# Patient Record
Sex: MALE | Race: WHITE | NOT HISPANIC OR LATINO | URBAN - METROPOLITAN AREA
[De-identification: names, ages, dates, MRNs, and addresses within clinical notes are randomized per-mention and may not be internally consistent; named-entity substitution may affect disease eponyms.]

---
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
## 2024-01-08 ENCOUNTER — APPOINTMENT (OUTPATIENT)
Dept: GENERAL RADIOLOGY | Facility: HOSPITAL | Age: 24
End: 2024-01-08
Payer: OTHER MISCELLANEOUS

## 2024-01-08 ENCOUNTER — HOSPITAL ENCOUNTER (EMERGENCY)
Facility: HOSPITAL | Age: 24
Discharge: HOME | End: 2024-01-08
Attending: EMERGENCY MEDICINE | Admitting: EMERGENCY MEDICINE
Payer: OTHER MISCELLANEOUS

## 2024-01-08 VITALS
DIASTOLIC BLOOD PRESSURE: 65 MMHG | HEIGHT: 72 IN | HEART RATE: 96 BPM | WEIGHT: 184.75 LBS | OXYGEN SATURATION: 98 % | RESPIRATION RATE: 18 BRPM | BODY MASS INDEX: 25.02 KG/M2 | SYSTOLIC BLOOD PRESSURE: 138 MMHG | TEMPERATURE: 98.8 F

## 2024-01-08 DIAGNOSIS — S82.892A CLOSED FRACTURE OF LEFT ANKLE, INITIAL ENCOUNTER: Primary | ICD-10-CM

## 2024-01-08 PROCEDURE — 99283 EMERGENCY DEPT VISIT LOW MDM: CPT

## 2024-01-08 PROCEDURE — 73610 X-RAY EXAM OF ANKLE: CPT

## 2024-01-08 PROCEDURE — 25010000002 MORPHINE PER 10 MG: Performed by: EMERGENCY MEDICINE

## 2024-01-08 PROCEDURE — 96374 THER/PROPH/DIAG INJ IV PUSH: CPT

## 2024-01-08 RX ORDER — OXYCODONE HYDROCHLORIDE AND ACETAMINOPHEN 5; 325 MG/1; MG/1
1 TABLET ORAL EVERY 6 HOURS PRN
Qty: 12 TABLET | Refills: 0 | Status: SHIPPED | OUTPATIENT
Start: 2024-01-08

## 2024-01-08 RX ADMIN — MORPHINE SULFATE 4 MG: 4 INJECTION, SOLUTION INTRAMUSCULAR; INTRAVENOUS at 13:47

## 2024-01-08 NOTE — ED PROVIDER NOTES
Time: 2:27 PM EST  Date of encounter:  1/8/2024  Independent Historian/Clinical History and Information was obtained by:   Patient and Family    History is limited by: N/A    Chief Complaint: Fall      History of Present Illness:  Patient is a 23 y.o. year old male who presents to the emergency department for evaluation of injury status post fall.  Patient was on a ladder at work and fell unsure exactly how far.  Patient reports when he fell he came down on his left leg does not not report hitting his head or loss of consciousness.  He reports he was unable to bear weight afterwards.    HPI    Patient Care Team  Primary Care Provider: Vinod Rosario MD    Past Medical History:     No Known Allergies  History reviewed. No pertinent past medical history.  History reviewed. No pertinent surgical history.  History reviewed. No pertinent family history.    Home Medications:  Prior to Admission medications    Medication Sig Start Date End Date Taking? Authorizing Provider   oxyCODONE-acetaminophen (PERCOCET) 5-325 MG per tablet Take 1 tablet by mouth Every 6 (Six) Hours As Needed for Severe Pain. 1/8/24   Robson Mandujano MD        Social History:   Social History     Tobacco Use    Smoking status: Never    Smokeless tobacco: Current     Types: Snuff   Vaping Use    Vaping Use: Never used   Substance Use Topics    Alcohol use: Not Currently    Drug use: Never         Review of Systems:  Review of Systems   Constitutional:  Negative for chills and fever.   HENT:  Negative for congestion, rhinorrhea and sore throat.    Eyes:  Negative for pain and visual disturbance.   Respiratory:  Negative for apnea, cough, chest tightness and shortness of breath.    Cardiovascular:  Negative for chest pain and palpitations.   Gastrointestinal:  Negative for abdominal pain, diarrhea, nausea and vomiting.   Genitourinary:  Negative for difficulty urinating and dysuria.   Musculoskeletal:  Negative for joint swelling and myalgias.   Skin:  " Negative for color change.   Neurological:  Negative for seizures and headaches.   Psychiatric/Behavioral: Negative.     All other systems reviewed and are negative.       Physical Exam:  /59 (BP Location: Right arm, Patient Position: Lying)   Pulse 77   Temp 98.8 °F (37.1 °C) (Oral)   Resp 18   Ht 182.9 cm (72\")   Wt 83.8 kg (184 lb 11.9 oz)   SpO2 99%   BMI 25.06 kg/m²     Physical Exam  Vitals and nursing note reviewed.   Constitutional:       General: He is not in acute distress.     Appearance: Normal appearance. He is not toxic-appearing.   HENT:      Head: Normocephalic and atraumatic.      Jaw: There is normal jaw occlusion.   Eyes:      General: Lids are normal.      Extraocular Movements: Extraocular movements intact.      Conjunctiva/sclera: Conjunctivae normal.      Pupils: Pupils are equal, round, and reactive to light.   Cardiovascular:      Rate and Rhythm: Normal rate and regular rhythm.      Pulses: Normal pulses.      Heart sounds: Normal heart sounds.   Pulmonary:      Effort: Pulmonary effort is normal. No respiratory distress.      Breath sounds: Normal breath sounds. No wheezing or rhonchi.   Abdominal:      General: Abdomen is flat.      Palpations: Abdomen is soft.      Tenderness: There is no abdominal tenderness. There is no guarding or rebound.   Musculoskeletal:      Cervical back: Normal range of motion and neck supple.      Right lower leg: No edema.      Left lower leg: No edema.      Comments: Left lower extremity: Swelling with tenderness to palpation of the ankle joint.  Neurovascular intact distally   Skin:     General: Skin is warm and dry.   Neurological:      Mental Status: He is alert and oriented to person, place, and time. Mental status is at baseline.   Psychiatric:         Mood and Affect: Mood normal.                  Procedures:  Procedures      Medical Decision Making:      Comorbidities that affect care:    None    External Notes reviewed:    None      The " following orders were placed and all results were independently analyzed by me:  Orders Placed This Encounter   Procedures    Splint Application    [facility] 11.  Crutches; Prevents Completion of MRADLs Within Reasonable Time Frame; Able to Safely Use Equipment; Mobility Deficit Can Be Sufficiently Resolved By Use of Equipment    XR Ankle 3+ View Left       Medications Given in the Emergency Department:  Medications   morphine injection 4 mg (4 mg Intravenous Given 1/8/24 1347)        ED Course:         Labs:    Lab Results (last 24 hours)       ** No results found for the last 24 hours. **             Imaging:    XR Ankle 3+ View Left    Result Date: 1/8/2024  PROCEDURE: XR ANKLE 3+ VW LEFT  COMPARISON: None  INDICATIONS: LEFT ANKLE PAIN AND SWELLING; FALL OFF OF LADDER TODAY AT WORK  FINDINGS:  There is a transverse fracture of the medial malleolus at the level of the ankle mortise.  There is also a transverse fracture of the distal fibula approximately 6 cm superior to the ankle mortise.  There appears to be disruption and widening of the syndesmosis inferior to the fibular fracture, and avulsion fractures at the lateral distal tibia.  There is mild lateral displacement of the medial malleolus and distal fibula with mild lateral talar shift.  There is surrounding soft tissue edema.        1. Transverse fractures of the medial malleolus and distal fibula with mild lateral displacement and lateral talar shift. 2. Disruption and widening of the distal syndesmosis and avulsion fractures at the lateral distal tibia.      POLO ENGEL MD       Electronically Signed and Approved By: POLO ENGEL MD on 1/08/2024 at 13:47                Differential Diagnosis and Discussion:    Extremity Pain: Differential diagnosis includes but is not limited to soft tissue sprain, tendonitis, tendon injury, dislocation, fracture, deep vein thrombosis, arterial insufficiency, osteoarthritis, bursitis, and ligamentous  damage.    All X-rays impressions were independently interpreted by me.    MDM  Number of Diagnoses or Management Options  Closed fracture of left ankle, initial encounter  Diagnosis management comments: In summary this is a 23-year-old male patient presents to the emergency department for evaluation of left leg injury status post fall from ladder.  X-ray confirms medial malleolus and distal fibula fracture.  Patient been placed in splint and given crutches.  Prescription pain control given.  Orthopedic surgery requested follow-up next Wednesday.  Very strict return to ER and follow-up instructions have been provided to the patient.                   Patient Care Considerations:    CT EXTREMITY: I considered ordering an extremity CT, however no signs of vascular compromise      Consultants/Shared Management Plan:    Consultant: I have discussed the case with Dr. Kirkland who states splint and follow-up next Wednesday in office    Social Determinants of Health:    Patient is independent, reliable, and has access to care.       Disposition and Care Coordination:    Discharged: The patient is suitable and stable for discharge with no need for consideration of observation or admission.    I have explained the patient´s condition, diagnoses and treatment plan based on the information available to me at this time. I have answered questions and addressed any concerns. The patient has a good  understanding of the patient´s diagnosis, condition, and treatment plan as can be expected at this point. The vital signs have been stable. The patient´s condition is stable and appropriate for discharge from the emergency department.      The patient will pursue further outpatient evaluation with the primary care physician or other designated or consulting physician as outlined in the discharge instructions. They are agreeable to this plan of care and follow-up instructions have been explained in detail. The patient has received these  instructions in written format and have expressed an understanding of the discharge instructions. The patient is aware that any significant change in condition or worsening of symptoms should prompt an immediate return to this or the closest emergency department or call to 1.  I have explained discharge medications and the need for follow up with the patient/caretakers. This was also printed in the discharge instructions. Patient was discharged with the following medications and follow up:      Medication List        New Prescriptions      oxyCODONE-acetaminophen 5-325 MG per tablet  Commonly known as: PERCOCET  Take 1 tablet by mouth Every 6 (Six) Hours As Needed for Severe Pain.               Where to Get Your Medications        These medications were sent to [place], [place] - [address] [place] [address] [phone]  - [phone] [address] [address]      Phone: [phone]   oxyCODONE-acetaminophen 5-325 MG per tablet      Been, Corby DOZIER MD  [address]   [place] [address] [phone]    On 1/17/2024  call       Final diagnoses:   Closed fracture of left ankle, initial encounter        ED Disposition       ED Disposition   Discharge    Condition   Stable    Comment   --               This medical record created using voice recognition software.             Robson Mandujano MD  01/08/24 [id number]

## 2024-01-11 ENCOUNTER — TELEPHONE (OUTPATIENT)
Dept: ORTHOPEDIC SURGERY | Facility: CLINIC | Age: 24
End: 2024-01-11
Payer: OTHER MISCELLANEOUS

## 2024-01-11 NOTE — TELEPHONE ENCOUNTER
1.Transverse fractures of the medial malleolus and distal fibula with mild lateral displacement   and lateral talar shift.  2. Disruption and widening of the distal syndesmosis and avulsion fractures at the lateral distal tibia.   XRAYS 1/8

## 2024-01-12 ENCOUNTER — OFFICE VISIT (OUTPATIENT)
Dept: ORTHOPEDIC SURGERY | Facility: CLINIC | Age: 24
End: 2024-01-12
Payer: OTHER MISCELLANEOUS

## 2024-01-12 ENCOUNTER — PREP FOR SURGERY (OUTPATIENT)
Dept: OTHER | Facility: HOSPITAL | Age: 24
End: 2024-01-12
Payer: OTHER MISCELLANEOUS

## 2024-01-12 VITALS
OXYGEN SATURATION: 95 % | BODY MASS INDEX: 23.7 KG/M2 | WEIGHT: 175 LBS | SYSTOLIC BLOOD PRESSURE: 145 MMHG | HEIGHT: 72 IN | HEART RATE: 86 BPM | DIASTOLIC BLOOD PRESSURE: 81 MMHG

## 2024-01-12 DIAGNOSIS — S82.52XA CLOSED DISPLACED FRACTURE OF MEDIAL MALLEOLUS OF LEFT TIBIA, INITIAL ENCOUNTER: Primary | ICD-10-CM

## 2024-01-12 RX ORDER — CEFAZOLIN SODIUM IN 0.9 % NACL 3 G/100 ML
3 INTRAVENOUS SOLUTION, PIGGYBACK (ML) INTRAVENOUS ONCE
Status: CANCELLED | OUTPATIENT
Start: 2024-01-12 | End: 2024-01-12

## 2024-01-12 RX ORDER — CEFAZOLIN SODIUM 2 G/100ML
2 INJECTION, SOLUTION INTRAVENOUS ONCE
Status: CANCELLED | OUTPATIENT
Start: 2024-01-12 | End: 2024-01-12

## 2024-01-12 NOTE — PROGRESS NOTES
"Chief Complaint  Initial Evaluation of the Left Ankle     Subjective      Eugenio Echols presents to [facility] for an evaluation of his left ankle. Patient states he was at work when he fell off a ladder and landed on his ankle. He was seen in the Emergency room on 01/08/24 where he had x-rays done. He is ambulating today with crutches and in a short leg splint. This is a work comp claim.      Allergies   Allergen Reactions    Doxycycline Shortness Of Breath        Social History     Socioeconomic History    Marital status: Single   Tobacco Use    Smoking status: Never    Smokeless tobacco: Current     Types: Snuff   Vaping Use    Vaping Use: Never used   Substance and Sexual Activity    Alcohol use: Not Currently    Drug use: Never    Sexual activity: Defer        I reviewed the patient's chief complaint, history of present illness, review of systems, past medical history, surgical history, family history, social history, medications, and allergy list.     Review of Systems     Constitutional: Denies fevers, chills, weight loss  Cardiovascular: Denies chest pain, shortness of breath  Skin: Denies rashes, acute skin changes  Neurologic: Denies headache, loss of consciousness  MSK: Left ankle pain       Vital Signs:   /81   Pulse 86   Ht 182.9 cm (72\")   Wt 79.4 kg (175 lb)   SpO2 95%   BMI 23.73 kg/m²          Physical Exam  General: Alert. No acute distress    Ortho Exam      Left lower extremity: short leg splint in place, splint is clean, dry and intact, limited range of motion due to pain, moderate swelling, able to move toes, sensation intact, neurovascularly intact      Procedures        Imaging Results (Most Recent)       None             Result Review :       XR Ankle 3+ View Left    Result Date: 1/8/2024  Narrative: PROCEDURE: XR ANKLE 3+ VW LEFT  COMPARISON: None  INDICATIONS: LEFT ANKLE PAIN AND SWELLING; FALL OFF OF LADDER TODAY AT WORK  FINDINGS:  There is a " transverse fracture of the medial malleolus at the level of the ankle mortise.  There is also a transverse fracture of the distal fibula approximately 6 cm superior to the ankle mortise.  There appears to be disruption and widening of the syndesmosis inferior to the fibular fracture, and avulsion fractures at the lateral distal tibia.  There is mild lateral displacement of the medial malleolus and distal fibula with mild lateral talar shift.  There is surrounding soft tissue edema.      Impression:   1. Transverse fractures of the medial malleolus and distal fibula with mild lateral displacement and lateral talar shift. 2. Disruption and widening of the distal syndesmosis and avulsion fractures at the lateral distal tibia.      POLO ENGEL MD       Electronically Signed and Approved By: POLO ENGEL MD on 1/08/2024 at 13:47                     Assessment and Plan     Diagnoses and all orders for this visit:    1. Closed displaced fracture of medial malleolus of left tibia, initial encounter (Primary)        The patient presents here today for an evaluation of his left ankle. X-rays were reviewed with the patient that were taken in the ER.     Discussed the risks and benefits of operative treatment plan with the patient. Patient expressed understanding and wishes to proceed with left ankle ORIF.    Will obtain X-Rays of left ankle at next visit.    Order for a knee scooter given today.     Discussed surgery., Risks/benefits discussed with patient including, but not limited to: infection, bleeding, neurovascular damage, malunion, nonunion, aesthetic deformity, need for further surgery, and death., Surgery pamphlet given., and Call or return if worsening symptoms.    Follow Up     2 weeks postop       Patient was given instructions and counseling regarding his condition or for health maintenance advice. Please see specific information pulled into the AVS if appropriate.     Scribed for Corby Kirkland MD by Izabella  Puja.  01/12/24   12:28 EST    I have personally performed the services described in this document as scribed by the above individual and it is both accurate and complete. Corby Kirkland MD 01/12/24

## 2024-01-15 ENCOUNTER — TELEPHONE (OUTPATIENT)
Dept: ORTHOPEDIC SURGERY | Facility: CLINIC | Age: 24
End: 2024-01-15
Payer: OTHER MISCELLANEOUS

## 2024-01-15 ENCOUNTER — ANESTHESIA (OUTPATIENT)
Dept: PERIOP | Facility: HOSPITAL | Age: 24
End: 2024-01-15
Payer: OTHER MISCELLANEOUS

## 2024-01-15 ENCOUNTER — HOSPITAL ENCOUNTER (OUTPATIENT)
Facility: HOSPITAL | Age: 24
Discharge: HOME | End: 2024-01-15
Attending: ORTHOPAEDIC SURGERY | Admitting: ORTHOPAEDIC SURGERY
Payer: OTHER MISCELLANEOUS

## 2024-01-15 ENCOUNTER — TELEPHONE (OUTPATIENT)
Dept: ORTHOPEDIC SURGERY | Facility: CLINIC | Age: 24
End: 2024-01-15

## 2024-01-15 ENCOUNTER — ANESTHESIA EVENT CONVERTED (OUTPATIENT)
Dept: ANESTHESIOLOGY | Facility: HOSPITAL | Age: 24
End: 2024-01-15
Payer: OTHER MISCELLANEOUS

## 2024-01-15 ENCOUNTER — APPOINTMENT (OUTPATIENT)
Dept: GENERAL RADIOLOGY | Facility: HOSPITAL | Age: 24
End: 2024-01-15
Payer: OTHER MISCELLANEOUS

## 2024-01-15 ENCOUNTER — ANESTHESIA EVENT (OUTPATIENT)
Dept: PERIOP | Facility: HOSPITAL | Age: 24
End: 2024-01-15
Payer: OTHER MISCELLANEOUS

## 2024-01-15 VITALS
RESPIRATION RATE: 16 BRPM | WEIGHT: 180 LBS | DIASTOLIC BLOOD PRESSURE: 77 MMHG | HEART RATE: 80 BPM | OXYGEN SATURATION: 100 % | TEMPERATURE: 97.2 F | SYSTOLIC BLOOD PRESSURE: 121 MMHG | HEIGHT: 72 IN | BODY MASS INDEX: 24.38 KG/M2

## 2024-01-15 DIAGNOSIS — S82.52XA CLOSED DISPLACED FRACTURE OF MEDIAL MALLEOLUS OF LEFT TIBIA, INITIAL ENCOUNTER: ICD-10-CM

## 2024-01-15 DIAGNOSIS — S82.842A ANKLE FRACTURE, BIMALLEOLAR, CLOSED, LEFT, INITIAL ENCOUNTER: Primary | ICD-10-CM

## 2024-01-15 PROCEDURE — C1713 ANCHOR/SCREW BN/BN,TIS/BN: HCPCS | Performed by: ORTHOPAEDIC SURGERY

## 2024-01-15 PROCEDURE — 25010000002 CEFAZOLIN IN DEXTROSE 2000 MG/ 100 ML SOLUTION: Performed by: ORTHOPAEDIC SURGERY

## 2024-01-15 PROCEDURE — C1769 GUIDE WIRE: HCPCS | Performed by: ORTHOPAEDIC SURGERY

## 2024-01-15 PROCEDURE — 25010000002 FENTANYL CITRATE (PF) 50 MCG/ML SOLUTION: Performed by: NURSE ANESTHETIST, CERTIFIED REGISTERED

## 2024-01-15 PROCEDURE — 25810000003 LACTATED RINGERS PER 1000 ML: Performed by: ANESTHESIOLOGY

## 2024-01-15 PROCEDURE — 25010000002 ROPIVACAINE PER 1 MG: Performed by: ANESTHESIOLOGY

## 2024-01-15 PROCEDURE — 25010000002 PROPOFOL 10 MG/ML EMULSION: Performed by: NURSE ANESTHETIST, CERTIFIED REGISTERED

## 2024-01-15 PROCEDURE — 25010000002 ONDANSETRON PER 1 MG: Performed by: NURSE ANESTHETIST, CERTIFIED REGISTERED

## 2024-01-15 PROCEDURE — 25010000002 MIDAZOLAM PER 1MG: Performed by: ANESTHESIOLOGY

## 2024-01-15 PROCEDURE — 25010000002 SUGAMMADEX 200 MG/2ML SOLUTION: Performed by: NURSE ANESTHETIST, CERTIFIED REGISTERED

## 2024-01-15 PROCEDURE — 25010000002 DEXAMETHASONE PER 1 MG: Performed by: NURSE ANESTHETIST, CERTIFIED REGISTERED

## 2024-01-15 PROCEDURE — 76000 FLUOROSCOPY <1 HR PHYS/QHP: CPT

## 2024-01-15 PROCEDURE — 27814 TREATMENT OF ANKLE FRACTURE: CPT | Performed by: ORTHOPAEDIC SURGERY

## 2024-01-15 PROCEDURE — 25010000002 FENTANYL CITRATE (PF) 50 MCG/ML SOLUTION: Performed by: ANESTHESIOLOGY

## 2024-01-15 PROCEDURE — S0260 H&P FOR SURGERY: HCPCS | Performed by: ORTHOPAEDIC SURGERY

## 2024-01-15 DEVICE — LOCKING SCREW
Type: IMPLANTABLE DEVICE | Site: ANKLE | Status: FUNCTIONAL
Brand: VARIAX

## 2024-01-15 DEVICE — BONE SCREW
Type: IMPLANTABLE DEVICE | Site: ANKLE | Status: FUNCTIONAL
Brand: VARIAX

## 2024-01-15 DEVICE — CANNULATED SCREW
Type: IMPLANTABLE DEVICE | Site: ANKLE | Status: FUNCTIONAL
Brand: ASNIS

## 2024-01-15 DEVICE — ONE-THIRD TUBULAR PLATE: Type: IMPLANTABLE DEVICE | Site: ANKLE | Status: FUNCTIONAL

## 2024-01-15 RX ORDER — DEXAMETHASONE SODIUM PHOSPHATE 4 MG/ML
INJECTION, SOLUTION INTRA-ARTICULAR; INTRALESIONAL; INTRAMUSCULAR; INTRAVENOUS; SOFT TISSUE AS NEEDED
Status: DISCONTINUED | OUTPATIENT
Start: 2024-01-15 | End: 2024-01-15 | Stop reason: SURG

## 2024-01-15 RX ORDER — ROCURONIUM BROMIDE 10 MG/ML
INJECTION, SOLUTION INTRAVENOUS AS NEEDED
Status: DISCONTINUED | OUTPATIENT
Start: 2024-01-15 | End: 2024-01-15 | Stop reason: SURG

## 2024-01-15 RX ORDER — ROPIVACAINE HYDROCHLORIDE 5 MG/ML
INJECTION, SOLUTION EPIDURAL; INFILTRATION; PERINEURAL
Status: COMPLETED | OUTPATIENT
Start: 2024-01-15 | End: 2024-01-15

## 2024-01-15 RX ORDER — MEPERIDINE HYDROCHLORIDE 25 MG/ML
12.5 INJECTION INTRAMUSCULAR; INTRAVENOUS; SUBCUTANEOUS
Status: DISCONTINUED | OUTPATIENT
Start: 2024-01-15 | End: 2024-01-15 | Stop reason: HOSPADM

## 2024-01-15 RX ORDER — FENTANYL CITRATE 50 UG/ML
100 INJECTION, SOLUTION INTRAMUSCULAR; INTRAVENOUS ONCE
Status: COMPLETED | OUTPATIENT
Start: 2024-01-15 | End: 2024-01-15

## 2024-01-15 RX ORDER — CEFAZOLIN SODIUM IN 0.9 % NACL 3 G/100 ML
3 INTRAVENOUS SOLUTION, PIGGYBACK (ML) INTRAVENOUS ONCE
Status: DISCONTINUED | OUTPATIENT
Start: 2024-01-15 | End: 2024-01-15

## 2024-01-15 RX ORDER — ONDANSETRON 2 MG/ML
INJECTION INTRAMUSCULAR; INTRAVENOUS AS NEEDED
Status: DISCONTINUED | OUTPATIENT
Start: 2024-01-15 | End: 2024-01-15 | Stop reason: SURG

## 2024-01-15 RX ORDER — PROMETHAZINE HYDROCHLORIDE 25 MG/1
25 SUPPOSITORY RECTAL ONCE AS NEEDED
Status: DISCONTINUED | OUTPATIENT
Start: 2024-01-15 | End: 2024-01-15 | Stop reason: HOSPADM

## 2024-01-15 RX ORDER — LIDOCAINE HYDROCHLORIDE 20 MG/ML
INJECTION, SOLUTION EPIDURAL; INFILTRATION; INTRACAUDAL; PERINEURAL AS NEEDED
Status: DISCONTINUED | OUTPATIENT
Start: 2024-01-15 | End: 2024-01-15 | Stop reason: SURG

## 2024-01-15 RX ORDER — DEXMEDETOMIDINE HYDROCHLORIDE 100 UG/ML
INJECTION, SOLUTION INTRAVENOUS AS NEEDED
Status: DISCONTINUED | OUTPATIENT
Start: 2024-01-15 | End: 2024-01-15 | Stop reason: SURG

## 2024-01-15 RX ORDER — HYDROCODONE BITARTRATE AND ACETAMINOPHEN 7.5; 325 MG/1; MG/1
1 TABLET ORAL EVERY 4 HOURS PRN
Qty: 30 TABLET | Refills: 0 | Status: SHIPPED | OUTPATIENT
Start: 2024-01-15

## 2024-01-15 RX ORDER — MIDAZOLAM HYDROCHLORIDE 2 MG/2ML
2 INJECTION, SOLUTION INTRAMUSCULAR; INTRAVENOUS ONCE
Status: COMPLETED | OUTPATIENT
Start: 2024-01-15 | End: 2024-01-15

## 2024-01-15 RX ORDER — SODIUM CHLORIDE, SODIUM LACTATE, POTASSIUM CHLORIDE, CALCIUM CHLORIDE 600; 310; 30; 20 MG/100ML; MG/100ML; MG/100ML; MG/100ML
9 INJECTION, SOLUTION INTRAVENOUS CONTINUOUS PRN
Status: DISCONTINUED | OUTPATIENT
Start: 2024-01-15 | End: 2024-01-15 | Stop reason: HOSPADM

## 2024-01-15 RX ORDER — PROPOFOL 10 MG/ML
VIAL (ML) INTRAVENOUS AS NEEDED
Status: DISCONTINUED | OUTPATIENT
Start: 2024-01-15 | End: 2024-01-15 | Stop reason: SURG

## 2024-01-15 RX ORDER — OXYCODONE HYDROCHLORIDE 5 MG/1
5 TABLET ORAL
Status: DISCONTINUED | OUTPATIENT
Start: 2024-01-15 | End: 2024-01-15 | Stop reason: HOSPADM

## 2024-01-15 RX ORDER — PROMETHAZINE HYDROCHLORIDE 12.5 MG/1
25 TABLET ORAL ONCE AS NEEDED
Status: DISCONTINUED | OUTPATIENT
Start: 2024-01-15 | End: 2024-01-15 | Stop reason: HOSPADM

## 2024-01-15 RX ORDER — ACETAMINOPHEN 500 MG
1000 TABLET ORAL ONCE
Status: COMPLETED | OUTPATIENT
Start: 2024-01-15 | End: 2024-01-15

## 2024-01-15 RX ORDER — FENTANYL CITRATE 50 UG/ML
INJECTION, SOLUTION INTRAMUSCULAR; INTRAVENOUS AS NEEDED
Status: DISCONTINUED | OUTPATIENT
Start: 2024-01-15 | End: 2024-01-15 | Stop reason: SURG

## 2024-01-15 RX ORDER — ONDANSETRON 2 MG/ML
4 INJECTION INTRAMUSCULAR; INTRAVENOUS ONCE AS NEEDED
Status: DISCONTINUED | OUTPATIENT
Start: 2024-01-15 | End: 2024-01-15 | Stop reason: HOSPADM

## 2024-01-15 RX ORDER — CEFAZOLIN SODIUM 2 G/100ML
2 INJECTION, SOLUTION INTRAVENOUS ONCE
Status: COMPLETED | OUTPATIENT
Start: 2024-01-15 | End: 2024-01-15

## 2024-01-15 RX ADMIN — DEXAMETHASONE SODIUM PHOSPHATE 4 MG: 4 INJECTION, SOLUTION INTRAMUSCULAR; INTRAVENOUS at 12:12

## 2024-01-15 RX ADMIN — ROCURONIUM BROMIDE 50 MG: 10 INJECTION, SOLUTION INTRAVENOUS at 11:57

## 2024-01-15 RX ADMIN — PROPOFOL 200 MG: 10 INJECTION, EMULSION INTRAVENOUS at 11:57

## 2024-01-15 RX ADMIN — FENTANYL CITRATE 50 MCG: 50 INJECTION, SOLUTION INTRAMUSCULAR; INTRAVENOUS at 11:57

## 2024-01-15 RX ADMIN — LIDOCAINE HYDROCHLORIDE 50 MG: 20 INJECTION, SOLUTION EPIDURAL; INFILTRATION; INTRACAUDAL; PERINEURAL at 11:57

## 2024-01-15 RX ADMIN — DEXMEDETOMIDINE HYDROCHLORIDE 20 MCG: 100 INJECTION, SOLUTION, CONCENTRATE INTRAVENOUS at 11:56

## 2024-01-15 RX ADMIN — ROPIVACAINE HYDROCHLORIDE 25 ML: 5 INJECTION, SOLUTION EPIDURAL; INFILTRATION; PERINEURAL at 11:10

## 2024-01-15 RX ADMIN — FENTANYL CITRATE 100 MCG: 50 INJECTION, SOLUTION INTRAMUSCULAR; INTRAVENOUS at 11:03

## 2024-01-15 RX ADMIN — DEXMEDETOMIDINE HYDROCHLORIDE 10 MCG: 100 INJECTION, SOLUTION, CONCENTRATE INTRAVENOUS at 12:14

## 2024-01-15 RX ADMIN — FENTANYL CITRATE 50 MCG: 50 INJECTION, SOLUTION INTRAMUSCULAR; INTRAVENOUS at 12:09

## 2024-01-15 RX ADMIN — CEFAZOLIN SODIUM 2 G: 2 INJECTION, SOLUTION INTRAVENOUS at 11:57

## 2024-01-15 RX ADMIN — DEXMEDETOMIDINE HYDROCHLORIDE 10 MCG: 100 INJECTION, SOLUTION, CONCENTRATE INTRAVENOUS at 12:07

## 2024-01-15 RX ADMIN — SUGAMMADEX 200 MG: 100 INJECTION, SOLUTION INTRAVENOUS at 12:44

## 2024-01-15 RX ADMIN — MIDAZOLAM HYDROCHLORIDE 2 MG: 1 INJECTION, SOLUTION INTRAMUSCULAR; INTRAVENOUS at 11:03

## 2024-01-15 RX ADMIN — ACETAMINOPHEN 1000 MG: 500 TABLET ORAL at 10:55

## 2024-01-15 RX ADMIN — ONDANSETRON 4 MG: 2 INJECTION INTRAMUSCULAR; INTRAVENOUS at 12:12

## 2024-01-15 RX ADMIN — SODIUM CHLORIDE, POTASSIUM CHLORIDE, SODIUM LACTATE AND CALCIUM CHLORIDE 9 ML/HR: 600; 310; 30; 20 INJECTION, SOLUTION INTRAVENOUS at 10:54

## 2024-01-15 RX ADMIN — ROPIVACAINE HYDROCHLORIDE 30 ML: 5 INJECTION, SOLUTION EPIDURAL; INFILTRATION; PERINEURAL at 11:18

## 2024-01-15 NOTE — ANESTHESIA PROCEDURE NOTES
Peripheral Block    Pre-sedation assessment completed: 1/15/2024 11:13 AM    Patient reassessed immediately prior to procedure    Patient location during procedure: pre-op  Start time: 1/15/2024 11:12 AM  Stop time: 1/15/2024 11:18 AM  Reason for block: at surgeon's request and post-op pain management  Performed by  Anesthesiologist: Pan Castro MD  Preanesthetic Checklist  Completed: patient identified, IV checked, site marked, risks and benefits discussed, surgical consent, monitors and equipment checked, pre-op evaluation and timeout performed  Prep:  Pt Position: supine  Sterile barriers:alcohol skin prep, partial drape, cap, washed/disinfected hands, mask and gloves  Prep: ChloraPrep  Patient monitoring: blood pressure monitoring, continuous pulse oximetry and EKG  Procedure    Sedation: yes  Performed under: local infiltration  Guidance:ultrasound guided and nerve stimulator    ULTRASOUND INTERPRETATION.  Using ultrasound guidance a 20 G gauge needle was placed in close proximity to the nerve, at which point, under ultrasound guidance anesthetic was injected in the area of the nerve and spread of the anesthesia was seen on ultrasound in close proximity thereto.  There were no abnormalities seen on ultrasound; a digital image was taken; and the patient tolerated the procedure with no complications. Images:still images obtained, printed/placed on chart    Laterality:left  Block Type:adductor canal block  Injection Technique:single-shot  Needle Type:echogenic  Needle Gauge:20 G (4in)  Resistance on Injection: none    Medications Used: ropivacaine (NAROPIN) 0.5 % injection - Injection   30 mL - 1/15/2024 11:18:00 AM      Post Assessment  Injection Assessment: negative aspiration for heme, no paresthesia on injection and incremental injection  Patient Tolerance:comfortable throughout block  Complications:no  Additional Notes  The block or continuous infusion is requested by the referring physician for  management of postoperative pain, or pain related to a procedure. Ultrasound guidance (deemed medically necessary). Painless injection, pt was awake and conversant during the procedure without complications. Needle and surrounding structures visualized throughout procedure. No adverse reactions or complications seen during this period. Post-procedure image showed no signs of complication, and anatomy was consistent with an uncomplicated nerve blockade.

## 2024-01-15 NOTE — OP NOTE
ANKLE OPEN REDUCTION INTERNAL FIXATION  Procedure Report    Patient Name:  Eugenio Echols  YOB: 2000    Date of Surgery:  1/15/2024     Indications: See H&P    Pre-op Diagnosis:   Closed displaced fracture of medial malleolus of left tibia, initial encounter [S82.52XA]     Left distal fibula fracture and left malleolus fracture   Post-Op Diagnosis Codes:     * Closed displaced fracture of medial malleolus of left tibia, initial encounter [S82.52XA]    Procedure/CPT® Codes:      Procedure(s):  Open reduction internal fixation of left distal fibula fracture open reduction traumatization of the left medial malleolus fracture      Staff:  Surgeon(s):  Corby Kirkland MD    Assistant: Janell Iverson    Anesthesia: Choice    Estimated Blood Loss: 25 mL    Implants:    Implant Name Type Inv. Item Serial No.  Lot No. LRB No. Used Action   PLT TBG 1/3 7H 83MM - [id number] Implant PLT TBG 1/3 7H 83MM  [employer]UN[employer]  Left 1 Implanted   SCRW BONE VARIAX T10 3.5X14MM - [id number] Implant SCRW BONE VARIAX T10 3.5X14MM  [employer]UN[employer]DESTA  Left 2 Implanted   SCRW LK BONE VARIAX T10 3.5X12MM - [id number] Implant SCRW LK BONE VARIAX T10 3.5X12MM  [employer]UNJAN MODESTA  Left 2 Implanted   SCRW LK BONE VARIAX T10 3.5X14MM - [id number] Implant SCRW LK BONE VARIAX T10 3.5X14MM  [employer]UNJAN MODESTA  Left 2 Implanted   SCRW DELMA ASNIS3 1/3THRD TI 4X38MM - [id number] Implant SCRW DELMA ASNIS3 1/3THRD TI 4X38MM  [employer]UNJAN MODESTA  Left 2 Implanted       Specimen:          None        Findings: See description    Complications: None    Description of Procedure: Patient was taken the op room placed supine operating table after general anesthesia established left ankle was prepped and draped in standard surgical fashion using alcohol and ChloraPrep and a centimeter incision was made over the distal fibula fracture fracture site was exposed using knife curette and irrigation and temporary duction was held with a Singaporean clamp a third  tubular Dillon plate was placed in the distal fibula cortical screws were filled proximally and distally and then locking screws were filled proximally distally as needed then the medial incision was made and dissection was carried down to the fracture site of the medial malleolus using a knife curette and irrigation the joint was exposed and lavage was no loose bodies a temporary duction was held with a dental pick while guidewires were placed across the fracture site along with the appropriately length partially-threaded cannulated lag screws and tightened down C-arm shots revealed anatomic alignment of the joint surface and negative external rotation test no complications from the implants the wound was copiously irrigated and then closed with Vicryl and staples incisions were washed and dry sterile dressings were applied including a well-padded posterior Ortho-Glass and stirrup with Ace wrap's patient tolerated seizure well was explained to recovery                Corby Kirkland MD     Date: 1/15/2024  Time: 15:01 EST

## 2024-01-15 NOTE — TELEPHONE ENCOUNTER
I spoke with patient and explained that the knee scooter was fine to use and that he is non-weightbearing on that leg until at least first follow up.  Patient voiced understanding.

## 2024-01-15 NOTE — DISCHARGE INSTRUCTIONS
DISCHARGE INSTRUCTIONS  ORTHOPEDICS      For your surgery you had:  General anesthesia (you may have a sore throat for the first 24 hours)  IV sedation.  Local anesthesia  Monitored anesthesia care    You may experience dizziness, drowsiness, or light-headedness for several hours following surgery  Do not stay alone today or tonight.  Limit your activity for 24 hours.  Resume your diet slowly.  Follow whatever special dietary instructions you may have been given by the doctor.  You should not drive or operate machinery or drink alcohol for 24 hours or while you are taking pain medication.  You should not sign any legally binding documents.  If you had an axillary or regional block, you will not have control of the involved limb for up to 12 hours.  Protect the arm with a sling or follow your physician's specific instructions.    You may sponge bathe.  Sleep with the injured part elevated on a pillow.  Medications per physician's instructions as indicated on Discharge Medication Information Sheet.  Follow verbal instructions of your doctor.  Carry the upper arm in a sling so that the hand and wrist are above the level of the heart.  Sit with the lower leg propped up on a footstool or chair with pillows.  Exercise fingers or toes for 10 minutes every hour while awake. Ice bag to injured area for 72 hours.  Apply 20 minutes on - 20 minutes off.  Never place ice directly on skin or cast.    Avoid getting cast or dressing wet.    In addition to these instructions, follow the discharge instructions on postoperative arthroscopic surgery.  SPECIAL INSTRUCTIONS:     Tylenol at 1055      Last dose of pain medication was given at:    NOTIFY THE PHYSICIAN IF YOU EXPERIENCE:  Numbness of fingers or toes.  Inability to move fingers or toes.  Extreme coldness, paleness or blue dis-coloration of fingers or toes.  Excessive swelling of affected surgical site or swelling that causes the cast to rub or cut into skin.  Pain unrelieved  by pain medication  Nausea/vomiting not relieved by prescribed medication  Unable to urinate in 6 hours after surgery  Temperature greater than 101 degree Fahrenheit or chills  If unable to reach your doctor, please go to the closest emergency room     ***** KEEP DRESSING CLEAN AND DRY UNTIL APPOINTMENT****

## 2024-01-15 NOTE — TELEPHONE ENCOUNTER
[facility] received a fax that requires your attention. The document has been indexed to the patient’s chart for your review.      Reason for sending: RECEIVED RECORDS REQUEST FROM VISIT ON 1/12/2024     Documents Description: RECORDS [id number]-2/12/2024    Name of Sender: VERA SCHOEN    Date Indexed:  1/15/2024

## 2024-01-15 NOTE — ANESTHESIA PROCEDURE NOTES
Peripheral Block    Pre-sedation assessment completed: 1/15/2024 11:00 AM    Patient reassessed immediately prior to procedure    Patient location during procedure: pre-op  Start time: 1/15/2024 11:05 AM  Stop time: 1/15/2024 11:10 AM  Reason for block: at surgeon's request and post-op pain management  Performed by  Anesthesiologist: Pan Castro MD  Preanesthetic Checklist  Completed: patient identified, IV checked, site marked, risks and benefits discussed, surgical consent, monitors and equipment checked, pre-op evaluation and timeout performed  Prep:  Pt Position: right lateral decubitus  Sterile barriers:alcohol skin prep, cap, gloves, mask, partial drape and washed/disinfected hands  Prep: ChloraPrep  Patient monitoring: blood pressure monitoring, continuous pulse oximetry and EKG  Procedure    Sedation: yes  Performed under: local infiltration  Guidance:ultrasound guided and nerve stimulator    ULTRASOUND INTERPRETATION.  Using ultrasound guidance a 20 G gauge needle was placed in close proximity to the nerve, at which point, under ultrasound guidance anesthetic was injected in the area of the nerve and spread of the anesthesia was seen on ultrasound in close proximity thereto.  There were no abnormalities seen on ultrasound; a digital image was taken; and the patient tolerated the procedure with no complications. Images:still images obtained, printed/placed on chart    Laterality:left  Block Type:popliteal  Injection Technique:single-shot  Needle Type:echogenic  Needle Gauge:20 G (4in)  Resistance on Injection: none    Medications Used: ropivacaine (NAROPIN) 0.5 % injection - Injection   25 mL - 1/15/2024 11:10:00 AM      Post Assessment  Injection Assessment: negative aspiration for heme, no paresthesia on injection and incremental injection  Patient Tolerance:comfortable throughout block  Complications:no  Additional Notes  The block or continuous infusion is requested by the referring physician for  management of postoperative pain, or pain related to a procedure. Ultrasound guidance (deemed medically necessary). Painless injection, pt was awake and conversant during the procedure without complications. Needle and surrounding structures visualized throughout procedure. No adverse reactions or complications seen during this period. Post-procedure image showed no signs of complication, and anatomy was consistent with an uncomplicated nerve blockade.

## 2024-01-15 NOTE — H&P
"H and p      Chief Complaint  Initial Evaluation of the Left Ankle        Subjective   Eugenio Echols presents to [facility] for an evaluation of his left ankle. Patient states he was at work when he fell off a ladder and landed on his ankle. He was seen in the Emergency room on 01/08/24 where he had x-rays done. He is ambulating today with crutches and in a short leg splint. This is a work comp claim.            Allergies   Allergen Reactions    Doxycycline Shortness Of Breath         Social History   Social History            Socioeconomic History    Marital status: Single   Tobacco Use    Smoking status: Never    Smokeless tobacco: Current       Types: Snuff   Vaping Use    Vaping Use: Never used   Substance and Sexual Activity    Alcohol use: Not Currently    Drug use: Never    Sexual activity: Defer            I reviewed the patient's chief complaint, history of present illness, review of systems, past medical history, surgical history, family history, social history, medications, and allergy list.      Review of Systems      Constitutional: Denies fevers, chills, weight loss  Cardiovascular: Denies chest pain, shortness of breath  Skin: Denies rashes, acute skin changes  Neurologic: Denies headache, loss of consciousness  MSK: Left ankle pain         Vital Signs:   /81   Pulse 86   Ht 182.9 cm (72\")   Wt 79.4 kg (175 lb)   SpO2 95%   BMI 23.73 kg/m²           Physical Exam  General: Alert. No acute distress     Ortho Exam       Left lower extremity: short leg splint in place, splint is clean, dry and intact, limited range of motion due to pain, moderate swelling, able to move toes, sensation intact, neurovascularly intact        Procedures           Imaging Results (Most Recent)         None                      Result Review   :         XR Ankle 3+ View Left     Result Date: 1/8/2024  Narrative: PROCEDURE:       XR ANKLE 3+ VW LEFT  COMPARISON:    None  INDICATIONS:   "         LEFT ANKLE PAIN AND SWELLING; FALL OFF OF LADDER TODAY AT WORK  FINDINGS:         There is a transverse fracture of the medial malleolus at the level of the ankle mortise.  There is also a transverse fracture of the distal fibula approximately 6 cm superior to the ankle mortise.  There appears to be disruption and widening of the syndesmosis inferior to the fibular fracture, and avulsion fractures at the lateral distal tibia.  There is mild lateral displacement of the medial malleolus and distal fibula with mild lateral talar shift.  There is surrounding soft tissue edema.       Impression:      1. Transverse fractures of the medial malleolus and distal fibula with mild lateral displacement and lateral talar shift. 2. Disruption and widening of the distal syndesmosis and avulsion fractures at the lateral distal tibia.      POLO ENGEL MD       Electronically Signed and Approved By: POLO ENGEL MD on 1/08/2024 at 13:47                         Assessment   Assessment and Plan      Diagnoses and all orders for this visit:     1. Closed displaced fracture of medial malleolus of left tibia, initial encounter (Primary)           The patient presents here today for an evaluation of his left ankle. X-rays were reviewed with the patient that were taken in the ER.      Discussed the risks and benefits of operative treatment plan with the patient. Patient expressed understanding and wishes to proceed with left ankle ORIF.     Will obtain X-Rays of left ankle at next visit.     Order for a knee scooter given today.      Discussed surgery., Risks/benefits discussed with patient including, but not limited to: infection, bleeding, neurovascular damage, malunion, nonunion, aesthetic deformity, need for further surgery, and death., Surgery pamphlet given., and Call or return if worsening symptoms.     Follow Up      2 weeks postop  Corby Kirkland MD  01/15/24    01/15/24

## 2024-01-15 NOTE — ANESTHESIA PREPROCEDURE EVALUATION
Anesthesia Evaluation     Patient summary reviewed and Nursing notes reviewed   NPO Solid Status: > 8 hours  NPO Liquid Status: > 8 hours           Airway   Mallampati: I  TM distance: >3 FB  Neck ROM: full  Dental - normal exam     Pulmonary - negative pulmonary ROS and normal exam   Cardiovascular - negative cardio ROS and normal exam  Exercise tolerance: excellent (>7 METS)        Neuro/Psych- negative ROS  GI/Hepatic/Renal/Endo - negative ROS     Musculoskeletal (-) negative ROS    Abdominal  - normal exam   Substance History - negative use     OB/GYN          Other - negative ROS                         Anesthesia Plan    ASA 1     general with block     intravenous induction     Anesthetic plan, risks, benefits, and alternatives have been provided, discussed and informed consent has been obtained with: patient.    Plan discussed with CRNA.        CODE STATUS:

## 2024-01-15 NOTE — ANESTHESIA POSTPROCEDURE EVALUATION
Patient: Eugenio Echols    Procedure Summary       Date: 01/15/24 Room / Location: [facility] OR [facility] / [facility]    Anesthesia Start: 1154 Anesthesia Stop: 1259    Procedure: ANKLE OPEN REDUCTION INTERNAL FIXATION (Left: Ankle) Diagnosis:       Closed displaced fracture of medial malleolus of left tibia, initial encounter      (Closed displaced fracture of medial malleolus of left tibia, initial encounter [S82.52XA])    Surgeons: Corby Kirkland MD Provider: Pan Castro MD    Anesthesia Type: general with block ASA Status: 1            Anesthesia Type: general with block    Vitals  Vitals Value Taken Time   /71 01/15/24 1329   Temp 36.7 °C (98.1 °F) 01/15/24 1258   Pulse 86 01/15/24 1330   Resp 16 01/15/24 1329   SpO2 100 % 01/15/24 1330   Vitals shown include unfiled device data.        Post Anesthesia Care and Evaluation    Patient location during evaluation: bedside  Patient participation: complete - patient participated  Level of consciousness: awake  Pain management: adequate    Airway patency: patent  Anesthetic complications: No anesthetic complications  PONV Status: none  Cardiovascular status: acceptable and stable  Respiratory status: acceptable  Hydration status: acceptable    Comments: An Anesthesiologist personally participated in the most demanding procedures (including induction and emergence if applicable) in the anesthesia plan, monitored the course of anesthesia administration at frequent intervals and remained physically present and available for immediate diagnosis and treatment of emergencies.

## 2024-01-15 NOTE — TELEPHONE ENCOUNTER
"HUB AGENT ATTEMPTED CLINICAL WARM TRANSFER - NO ANSWER     PATIENT's MOM LETTY REPORTED PATIENT \"ASKED ABOUT GETTING A KNEE SCOOTER & A SCRIPT WAS WRITTEN / GIVEN BY ONE OF THE LADIES BEFORE CHECKING OUT\" AFTER 01-12-24 APPT w/DR LOPES     HOWEVER [facility] STAFF RECOMMENDED AGAINST THE KNEE SCOOTER AFTER PATIENT's DISCHARGE POST LEFT ANKLE ORIF SURGERY DONE TODAY 01-15-24 BY DR LOPES     PLEASE CALL / LEAVE VMAIL TO DISCUSS WEIGHT-BEARING STATUS & KNEE SCOOTER RECOMMENDATIONS     THANKS   "

## 2024-01-29 ENCOUNTER — OFFICE VISIT (OUTPATIENT)
Dept: ORTHOPEDIC SURGERY | Facility: CLINIC | Age: 24
End: 2024-01-29
Payer: OTHER MISCELLANEOUS

## 2024-01-29 VITALS
HEART RATE: 101 BPM | SYSTOLIC BLOOD PRESSURE: 130 MMHG | DIASTOLIC BLOOD PRESSURE: 85 MMHG | HEIGHT: 72 IN | BODY MASS INDEX: 24.38 KG/M2 | WEIGHT: 180 LBS | OXYGEN SATURATION: 94 %

## 2024-01-29 DIAGNOSIS — Z47.89 AFTERCARE FOLLOWING SURGERY OF THE MUSCULOSKELETAL SYSTEM: ICD-10-CM

## 2024-01-29 DIAGNOSIS — S82.52XD CLOSED DISPLACED FRACTURE OF MEDIAL MALLEOLUS OF LEFT TIBIA WITH ROUTINE HEALING, SUBSEQUENT ENCOUNTER: Primary | ICD-10-CM

## 2024-01-29 NOTE — PROGRESS NOTES
" Chief Complaint  Follow-up of the Left Ankle and Suture / Staple Removal    Subjective      Eugenio Echols presents to [facility] for 2-week follow-up of left ankle ORIF with Dr. Kirkland on 1/15/2024.  Patient presents today with a splint in place which was removed in office.  Reports that he is not requiring any pain medication.  Overall he is doing very well.    Objective   Allergies   Allergen Reactions    Doxycycline Shortness Of Breath       Vital Signs:   /85   Pulse 101   Ht 182.9 cm (72\")   Wt 81.6 kg (180 lb)   SpO2 94%   BMI 24.41 kg/m²       Physical Exam    Constitutional: Awake, alert. Well nourished appearance.    Integumentary: Warm, dry, intact. No obvious rashes.    HENT: Atraumatic, normocephalic.   Respiratory: Non labored respirations .   Cardiovascular: Intact peripheral pulses.    Psychiatric: Normal mood and affect. A&O X3    Ortho Exam  Left ankle: Incisions are well-approximated and healing appropriately.  There is no redness, drainage.  Sensation is intact throughout the foot and into the toes.  Capillary refill time is brisk.  He is able to move all of his toes.  Neurovascular intact.    Imaging Results (Most Recent)       Procedure Component Value Units Date/Time    XR Ankle 3+ View Left [[id number]] Resulted: 01/29/24 1505     Updated: 01/29/24 1505    Narrative:      X-Ray Report:  Study: X-rays ordered, taken in the office, and reviewed today.   Site: Left ankle xray  Indication: Left ankle ORIF  View: AP/Lateral view(s)  Findings: Intact left ankle ORIF . No evidence of hardware malfunction.   Prior studies available for comparison: yes              Orthopedic Injury Treatment    Date/Time: 1/29/2024 2:27 PM    Performed by: Lizett Baron APRN  Authorized by: Lizett Baron APRN  Injury location: ankle  Location details: left ankle  Pre-procedure neurovascular assessment: neurovascularly intact    Anesthesia:  Local anesthesia used: " no    Sedation:  Patient sedated: no    Immobilization: cast  Splint type: short leg  Supplies used: cotton padding (fiberglass)  Post-procedure neurovascular assessment: post-procedure neurovascularly intact  Patient tolerance: patient tolerated the procedure well with no immediate complications  Comments: Patient was placed in fiberglass cast today.  The patient tolerated the procedure without any complications. Applied by Blank Kahn MA.               Assessment and Plan   Problem List Items Addressed This Visit          Musculoskeletal and Injuries    Closed displaced fracture of medial malleolus of left tibia - Primary    Relevant Orders    XR Ankle 3+ View Left (Completed)     Other Visit Diagnoses       Aftercare following left ankle ORIF        Relevant Orders    Orthopedic Injury Treatment            Follow Up   Return in about 4 weeks (around 2/26/2024).    Patient is a non-smoker, did not discuss options for smoking cessation.     Social History     Socioeconomic History    Marital status: Single   Tobacco Use    Smoking status: Never    Smokeless tobacco: Current     Types: Snuff   Vaping Use    Vaping Use: Never used   Substance and Sexual Activity    Alcohol use: Not Currently    Drug use: Never    Sexual activity: Defer       Patient Instructions   X-rays taken and reviewed with patient.  Fracture is stable and hardware is intact.    Patient placed in a fiberglass cast.  Educated on cast care.  Elevate the extremity above the heart for swelling and pain.  Do not put anything in the cast and do not get the cast wet.    Continue nonweightbearing status.    Follow-up in 4 weeks with repeat x-rays.  Call for questions, concerns or worsening symptoms.  Patient was given instructions and counseling regarding his condition or for health maintenance advice. Please see specific information pulled into the AVS if appropriate.

## 2024-02-05 NOTE — PATIENT INSTRUCTIONS
X-rays taken and reviewed with patient.  Fracture is stable and hardware is intact.    Patient placed in a fiberglass cast.  Educated on cast care.  Elevate the extremity above the heart for swelling and pain.  Do not put anything in the cast and do not get the cast wet.    Continue nonweightbearing status.    Follow-up in 4 weeks with repeat x-rays.  Call for questions, concerns or worsening symptoms.

## 2024-03-04 ENCOUNTER — OFFICE VISIT (OUTPATIENT)
Dept: ORTHOPEDIC SURGERY | Facility: CLINIC | Age: 24
End: 2024-03-04
Payer: OTHER MISCELLANEOUS

## 2024-03-04 VITALS — BODY MASS INDEX: 24.38 KG/M2 | HEIGHT: 72 IN | WEIGHT: 180 LBS

## 2024-03-04 DIAGNOSIS — Z47.89 AFTERCARE FOLLOWING SURGERY OF THE MUSCULOSKELETAL SYSTEM: ICD-10-CM

## 2024-03-04 DIAGNOSIS — S82.52XD CLOSED DISPLACED FRACTURE OF MEDIAL MALLEOLUS OF LEFT TIBIA WITH ROUTINE HEALING, SUBSEQUENT ENCOUNTER: Primary | ICD-10-CM

## 2024-03-04 PROCEDURE — 99024 POSTOP FOLLOW-UP VISIT: CPT

## 2024-03-04 NOTE — PATIENT INSTRUCTIONS
X-rays taken and reviewed with patient.  Hardware and fractures are stable and intact.    Cast was removed in office and patient transition to a boot.  Advised to begin partial weightbearing status as tolerated.  May progress as tolerated to full weightbearing status with the boot.    Note for work given today.    Follow-up in 4 weeks with repeat x-rays.  Call for questions, concerns or worsening symptoms.

## 2024-03-04 NOTE — PROGRESS NOTES
"Chief Complaint  Follow-up of the Left Ankle    Subjective      Eugenio Echols presents to [facility] for follow-up of left ankle ORIF with Dr. Kirkland on 1/15/2024.  Patient presents today with a cast which was removed in office.  Reports that he has not had pain within the cast.  He is remain nonweightbearing.  Overall he is doing well.    Objective   Allergies   Allergen Reactions    Doxycycline Shortness Of Breath       Vital Signs:   Ht 182.9 cm (72\")   Wt 81.6 kg (180 lb)   BMI 24.41 kg/m²       Physical Exam    Constitutional: Awake, alert. Well nourished appearance.    Integumentary: Warm, dry, intact. No obvious rashes.    HENT: Atraumatic, normocephalic.   Respiratory: Non labored respirations .   Cardiovascular: Intact peripheral pulses.    Psychiatric: Normal mood and affect. A&O X3    Ortho Exam  Left ankle: Incisions are completely healed well-approximated.  There is no redness, drainage or tenderness.  Limited range of motion with dorsiflexion, plantarflexion, inversion and eversion.  He is able to move all of his toes.  Capillary refill time is brisk.  Sensation is intact.  Neurovascular intact.  No edema noted.    Imaging Results (Most Recent)       Procedure Component Value Units Date/Time    XR Ankle 3+ View Left [[id number]] Resulted: 03/04/24 1245     Updated: 03/04/24 1246    Narrative:      X-Ray Report:  Study: X-rays ordered, taken in the office, and reviewed today.   Site: Left ankle xray  Indication: Left ankle ORIF  View: AP/Lateral view(s)  Findings: Intact left ankle ORIF. No evidence of hardware malfunction.   Prior studies available for comparison: yes                       Assessment and Plan   Problem List Items Addressed This Visit          Musculoskeletal and Injuries    Closed displaced fracture of medial malleolus of left tibia - Primary    Relevant Orders    XR Ankle 3+ View Left (Completed)     Other Visit Diagnoses       Aftercare following left " ankle ORIF                Follow Up   Return in about 4 weeks (around 4/1/2024).    Patient is a non-smoker, did not discuss options for smoking cessation.     Social History     Socioeconomic History    Marital status: Single   Tobacco Use    Smoking status: Never    Smokeless tobacco: Current     Types: Snuff   Vaping Use    Vaping status: Never Used   Substance and Sexual Activity    Alcohol use: Not Currently    Drug use: Never    Sexual activity: Defer       Patient Instructions   X-rays taken and reviewed with patient.  Hardware and fractures are stable and intact.    Cast was removed in office and patient transition to a boot.  Advised to begin partial weightbearing status as tolerated.  May progress as tolerated to full weightbearing status with the boot.    Note for work given today.    Follow-up in 4 weeks with repeat x-rays.  Call for questions, concerns or worsening symptoms.  Patient was given instructions and counseling regarding his condition or for health maintenance advice. Please see specific information pulled into the AVS if appropriate.

## 2024-03-08 ENCOUNTER — PATIENT ROUNDING (BHMG ONLY) (OUTPATIENT)
Dept: FAMILY MEDICINE CLINIC | Facility: CLINIC | Age: 24
End: 2024-03-08

## 2024-03-08 ENCOUNTER — OFFICE VISIT (OUTPATIENT)
Dept: FAMILY MEDICINE CLINIC | Facility: CLINIC | Age: 24
End: 2024-03-08
Payer: COMMERCIAL

## 2024-03-08 VITALS
BODY MASS INDEX: 23.57 KG/M2 | TEMPERATURE: 98 F | HEART RATE: 79 BPM | SYSTOLIC BLOOD PRESSURE: 125 MMHG | OXYGEN SATURATION: 99 % | WEIGHT: 174 LBS | HEIGHT: 72 IN | DIASTOLIC BLOOD PRESSURE: 87 MMHG

## 2024-03-08 DIAGNOSIS — Z00.00 ANNUAL PHYSICAL EXAM: Primary | ICD-10-CM

## 2024-03-08 DIAGNOSIS — Z23 ENCOUNTER FOR IMMUNIZATION: ICD-10-CM

## 2024-03-08 DIAGNOSIS — R73.01 ELEVATED FASTING BLOOD SUGAR: ICD-10-CM

## 2024-03-08 PROBLEM — K20.90 ESOPHAGITIS: Status: ACTIVE | Noted: 2019-10-24

## 2024-03-08 PROBLEM — R07.9 CHEST PAIN: Status: ACTIVE | Noted: 2019-10-24

## 2024-03-08 PROBLEM — J02.0 STREP PHARYNGITIS: Status: ACTIVE | Noted: 2019-10-24

## 2024-03-08 PROBLEM — E86.0 LUETSCHER'S SYNDROME: Status: ACTIVE | Noted: 2019-10-24

## 2024-03-08 LAB
ALBUMIN SERPL-MCNC: 4.4 G/DL (ref 3.5–5.2)
ALBUMIN/GLOB SERPL: 1.7 G/DL
ALP SERPL-CCNC: 69 U/L (ref 39–117)
ALT SERPL W P-5'-P-CCNC: 23 U/L (ref 1–41)
ANION GAP SERPL CALCULATED.3IONS-SCNC: 10.3 MMOL/L (ref 5–15)
AST SERPL-CCNC: 30 U/L (ref 1–40)
BASOPHILS # BLD AUTO: 0.06 10*3/MM3 (ref 0–0.2)
BASOPHILS NFR BLD AUTO: 0.9 % (ref 0–1.5)
BILIRUB SERPL-MCNC: 0.4 MG/DL (ref 0–1.2)
BUN SERPL-MCNC: 16 MG/DL (ref 6–20)
BUN/CREAT SERPL: 14.4 (ref 7–25)
CALCIUM SPEC-SCNC: 9.4 MG/DL (ref 8.6–10.5)
CHLORIDE SERPL-SCNC: 102 MMOL/L (ref 98–107)
CO2 SERPL-SCNC: 26.7 MMOL/L (ref 22–29)
CREAT SERPL-MCNC: 1.11 MG/DL (ref 0.76–1.27)
DEPRECATED RDW RBC AUTO: 39.5 FL (ref 37–54)
EGFRCR SERPLBLD CKD-EPI 2021: 96.9 ML/MIN/1.73
EOSINOPHIL # BLD AUTO: 1.3 10*3/MM3 (ref 0–0.4)
EOSINOPHIL NFR BLD AUTO: 18.6 % (ref 0.3–6.2)
ERYTHROCYTE [DISTWIDTH] IN BLOOD BY AUTOMATED COUNT: 12.1 % (ref 12.3–15.4)
GLOBULIN UR ELPH-MCNC: 2.6 GM/DL
GLUCOSE SERPL-MCNC: 91 MG/DL (ref 65–99)
HBA1C MFR BLD: 4.8 % (ref 4.8–5.6)
HCT VFR BLD AUTO: 48.6 % (ref 37.5–51)
HGB BLD-MCNC: 16.1 G/DL (ref 13–17.7)
IMM GRANULOCYTES # BLD AUTO: 0.01 10*3/MM3 (ref 0–0.05)
IMM GRANULOCYTES NFR BLD AUTO: 0.1 % (ref 0–0.5)
LYMPHOCYTES # BLD AUTO: 2.7 10*3/MM3 (ref 0.7–3.1)
LYMPHOCYTES NFR BLD AUTO: 38.6 % (ref 19.6–45.3)
MCH RBC QN AUTO: 29.7 PG (ref 26.6–33)
MCHC RBC AUTO-ENTMCNC: 33.1 G/DL (ref 31.5–35.7)
MCV RBC AUTO: 89.5 FL (ref 79–97)
MONOCYTES # BLD AUTO: 0.39 10*3/MM3 (ref 0.1–0.9)
MONOCYTES NFR BLD AUTO: 5.6 % (ref 5–12)
NEUTROPHILS NFR BLD AUTO: 2.53 10*3/MM3 (ref 1.7–7)
NEUTROPHILS NFR BLD AUTO: 36.2 % (ref 42.7–76)
NRBC BLD AUTO-RTO: 0 /100 WBC (ref 0–0.2)
PLATELET # BLD AUTO: 236 10*3/MM3 (ref 140–450)
PMV BLD AUTO: 9.3 FL (ref 6–12)
POTASSIUM SERPL-SCNC: 4.7 MMOL/L (ref 3.5–5.2)
PROT SERPL-MCNC: 7 G/DL (ref 6–8.5)
RBC # BLD AUTO: 5.43 10*6/MM3 (ref 4.14–5.8)
SODIUM SERPL-SCNC: 139 MMOL/L (ref 136–145)
T4 FREE SERPL-MCNC: 1.67 NG/DL (ref 0.93–1.7)
TSH SERPL DL<=0.05 MIU/L-ACNC: 1.75 UIU/ML (ref 0.27–4.2)
WBC NRBC COR # BLD AUTO: 6.99 10*3/MM3 (ref 3.4–10.8)

## 2024-03-08 PROCEDURE — 84439 ASSAY OF FREE THYROXINE: CPT | Performed by: FAMILY MEDICINE

## 2024-03-08 PROCEDURE — 83036 HEMOGLOBIN GLYCOSYLATED A1C: CPT | Performed by: FAMILY MEDICINE

## 2024-03-08 PROCEDURE — 80050 GENERAL HEALTH PANEL: CPT | Performed by: FAMILY MEDICINE

## 2024-03-08 NOTE — PROGRESS NOTES
...  Venipuncture Blood Specimen Collection  Venipuncture performed in LT arm by Sara Espino MA with good hemostasis. Patient tolerated the procedure well without complications.   03/08/24   Sara Espino MA

## 2024-03-08 NOTE — PROGRESS NOTES
"Chief Complaint  Hyperglycemia (Pt concerned with elevated blood sugars ) and Hypotension (Pt concerned with low blood pressure numbers )    Subjective      History of Present Illness    Eugenio Echols is a 23 y.o. male who presents to [facility]     Patient here to establish care.    PMH: has been healthy  SH: Non-smoker, uses snuff.  No alcohol or drug use.    Hyperglycemia. He had been checking his blood glucose at home and it's been 100-115ish fasting and up to 140 in the afternoon after it had been ~4 hours after eating. He only started checking it because he was told his glucose was elevated when he had labs done a while ago at another doctor's office.    Concern with blood pressure. He is concerned the diastolic has been low. It would get as low as 102/48, then up to 130 systolic. No lightheadedness or dizziness.     Objective   Vital Signs:   Vitals:    03/08/24 0814   BP: 125/87   Pulse: 79   Temp: 98 °F (36.7 °C)   SpO2: 99%   Weight: 78.9 kg (174 lb)   Height: 182.9 cm (72\")     Body mass index is 23.6 kg/m².    Wt Readings from Last 3 Encounters:   03/08/24 78.9 kg (174 lb)   03/04/24 81.6 kg (180 lb)   01/29/24 81.6 kg (180 lb)     BP Readings from Last 3 Encounters:   03/08/24 125/87   01/29/24 130/85   01/15/24 121/77       Health Maintenance   Topic Date Due    HEPATITIS C SCREENING  Never done    INFLUENZA VACCINE  08/01/2023    COVID-19 Vaccine (1 - 2023-24 season) 03/10/2024 (Originally 9/1/2023)    ANNUAL PHYSICAL  03/08/2025    TDAP/TD VACCINES (3 - Td or Tdap) 03/08/2034    Pneumococcal Vaccine 0-64  Completed    MENINGOCOCCAL VACCINE  Completed    HPV VACCINES  Completed       Physical Exam  Vitals and nursing note reviewed.   Constitutional:       General: He is not in acute distress.  Cardiovascular:      Rate and Rhythm: Normal rate and regular rhythm.      Heart sounds: Normal heart sounds.   Pulmonary:      Effort: Pulmonary effort is normal. No " respiratory distress.      Breath sounds: Normal breath sounds.   Neurological:      Mental Status: He is alert.   Psychiatric:         Mood and Affect: Mood normal.         Behavior: Behavior normal.          Result Review :  The following data was reviewed by: Edmund Segura MD on 03/08/2024:         Procedures          Assessment & Plan  Annual physical exam  Routine lab work obtained today  Elevated fasting blood sugar  Check A1c today  Encounter for immunization    Advised that his blood pressure was normotensive today.  He has not been having any lightheadedness or dizziness or symptoms of hypotension.  Discussed that if any of that occurs to let us know.    Orders Placed This Encounter   Procedures    Tdap Vaccine => 8yo IM (BOOSTRIX)    Comprehensive Metabolic Panel    Hemoglobin A1c    TSH+Free T4    CBC Auto Differential    CBC & Differential           Health Maintenance Due   Topic Date Due    HEPATITIS C SCREENING  Never done    INFLUENZA VACCINE  08/01/2023     Patient was interested in Tdap vaccine.  I recommended COVID-vaccine    BMI is within normal parameters. No other follow-up for BMI required.         FOLLOW UP  Return if symptoms worsen or fail to improve.  Patient was given instructions and counseling regarding his condition or for health maintenance advice. Please see specific information pulled into the AVS if appropriate.       Edmund Segura MD  03/08/24  08:49 EST    CURRENT & DISCONTINUED MEDICATIONS  Current Outpatient Medications   Medication Instructions    NON FORMULARY Pt taking an OTC med called SLIN for blood sugar . Unsure of dose        There are no discontinued medications.

## 2024-03-08 NOTE — PROGRESS NOTES
My name is Amie Hodges      I am  with [facility]  [facility]  [address] [place] [address] [address] [place] [id number] [phone].    I am sending this message to officially welcome you to our practice and ask about your recent visit.    Tell me about your visit with us. What things went well?       We're always looking for ways to make our patients' experiences even better. Do you have recommendations on ways we may improve?      Overall were you satisfied with your first visit to our practice?        I appreciate you taking the time to respond to me today. Is there anything else I can do for you?     Thank you, and have a great day.

## 2024-03-20 ENCOUNTER — OFFICE VISIT (OUTPATIENT)
Dept: FAMILY MEDICINE CLINIC | Facility: CLINIC | Age: 24
End: 2024-03-20
Payer: COMMERCIAL

## 2024-03-20 VITALS
DIASTOLIC BLOOD PRESSURE: 70 MMHG | HEART RATE: 99 BPM | OXYGEN SATURATION: 99 % | BODY MASS INDEX: 23.6 KG/M2 | SYSTOLIC BLOOD PRESSURE: 122 MMHG | WEIGHT: 174 LBS | TEMPERATURE: 98 F

## 2024-03-20 DIAGNOSIS — N52.9 ERECTILE DYSFUNCTION, UNSPECIFIED ERECTILE DYSFUNCTION TYPE: ICD-10-CM

## 2024-03-20 DIAGNOSIS — R03.0 ELEVATED BLOOD PRESSURE, SITUATIONAL: Primary | ICD-10-CM

## 2024-03-20 NOTE — PROGRESS NOTES
Chief Complaint  Hypertension (Blood pressure recently elevated )    Subjective      Eugenio Echols is a 23 y.o. male who presents to [facility]     Blood pressure was recently elevated.  Blood pressure today is normotensive at 118/70 and 122/70 when rechecked.  Notably when I saw him 12 days ago as a new patient he was concerned about low blood pressures, with a systolic ranging from the low 100s up to 130. His BP was high at his previous PCP.  But BP has been normal here.  Not having any other symptoms.    Erectile dysfunction.  This is his main concern.  He was put on cialis 5mg for erectile dysfunction by his previous PCP, been on it for over a month. Worked really well for the first 2-3 weeks, but then lately it has not been working for him. He takes it regularly at 5 PM daily. He says for a while he was having urinary urgency but that has been normal lately. No discharge or lumps or bumps. No numbness or tingling in the groin or legs. He reports being a little stressed over the ED since it first happened.  Several months ago he used anabolic steroids for about 8 weeks while working out so he was unsure if that was related but he had no issues at that time.  He had his testosterone checked at another doctor's office recently and that was normal.    Objective   Vital Signs:   Vitals:    03/20/24 1037 03/20/24 1039   BP: 118/70 122/70   Pulse: 99    Temp: 98 °F (36.7 °C)    SpO2: 99%    Weight: 78.9 kg (174 lb)      Body mass index is 23.6 kg/m².    Wt Readings from Last 3 Encounters:   03/20/24 78.9 kg (174 lb)   03/08/24 78.9 kg (174 lb)   03/04/24 81.6 kg (180 lb)     BP Readings from Last 3 Encounters:   03/20/24 122/70   03/08/24 125/87   01/29/24 130/85       Health Maintenance   Topic Date Due    HEPATITIS C SCREENING  Never done    INFLUENZA VACCINE  08/01/2023    COVID-19 Vaccine (1 - 2023-24 season) Never done    ANNUAL PHYSICAL  03/08/2025    TDAP/TD VACCINES (3 - Td  or Tdap) 03/08/2034    Pneumococcal Vaccine 0-64  Completed    MENINGOCOCCAL VACCINE  Completed    HPV VACCINES  Completed       Physical Exam  Vitals and nursing note reviewed.   Constitutional:       General: He is not in acute distress.  Cardiovascular:      Rate and Rhythm: Normal rate.   Pulmonary:      Effort: Pulmonary effort is normal. No respiratory distress.   Neurological:      Mental Status: He is alert.   Psychiatric:         Mood and Affect: Mood normal.         Behavior: Behavior normal.          Result Review :  The following data was reviewed by: Edmund Segura MD on 03/20/2024:         Procedures          Assessment & Plan  Elevated blood pressure, situational    Blood pressure is normotensive today and he was normotensive when he was here couple weeks ago as well.  Discussed that blood pressure can naturally fluctuate with activity, pain, stress.  With him being normotensive and otherwise healthy I advised he does not need to monitor his blood pressure regularly.  Erectile dysfunction, unspecified erectile dysfunction type  Discussed that erectile dysfunction is often psychosomatic (performance anxiety) especially in someone who is otherwise young and healthy like himself.  Recommended ensuring he felt comfortable with his partner, adequate foreplay during intercourse, and if he does take the Cialis, to not take it daily on a schedule.  Take it 30 minutes prior to intercourse.  Taking it daily and outside of this time window will likely make it ineffective.  Can go up to 10 mg Cialis if needed.             BMI is within normal parameters. No other follow-up for BMI required.         FOLLOW UP  Return if symptoms worsen or fail to improve.  Patient was given instructions and counseling regarding his condition or for health maintenance advice. Please see specific information pulled into the AVS if appropriate.       Edmund Segura MD  03/20/24  10:58 EDT    CURRENT & DISCONTINUED  MEDICATIONS  Current Outpatient Medications   Medication Instructions    NON FORMULARY Pt taking an OTC med called SLIN for blood sugar . Unsure of dose        There are no discontinued medications.

## 2024-04-01 ENCOUNTER — OFFICE VISIT (OUTPATIENT)
Dept: ORTHOPEDIC SURGERY | Facility: CLINIC | Age: 24
End: 2024-04-01
Payer: OTHER MISCELLANEOUS

## 2024-04-01 VITALS
HEIGHT: 72 IN | HEART RATE: 91 BPM | SYSTOLIC BLOOD PRESSURE: 143 MMHG | DIASTOLIC BLOOD PRESSURE: 78 MMHG | OXYGEN SATURATION: 97 % | BODY MASS INDEX: 24.38 KG/M2 | WEIGHT: 180 LBS

## 2024-04-01 DIAGNOSIS — M79.672 LEFT FOOT PAIN: Primary | ICD-10-CM

## 2024-04-01 DIAGNOSIS — Z47.89 AFTERCARE FOLLOWING SURGERY OF THE MUSCULOSKELETAL SYSTEM: ICD-10-CM

## 2024-04-01 PROCEDURE — 99024 POSTOP FOLLOW-UP VISIT: CPT

## 2024-04-01 NOTE — PROGRESS NOTES
"Chief Complaint  Follow-up of the Left Ankle    Subjective      Eugenio Echols presents to [facility] for follow-up of left ankle ORIF with Dr. Kirkland on 1/15/2024.  Patient presents today in a boot.  Reports that he is still having some difficulty with ambulating.  He has been doing home exercises.  Reports that his pain is improving.    Objective   Allergies   Allergen Reactions    Doxycycline Shortness Of Breath       Vital Signs:   /78   Pulse 91   Ht 182.9 cm (72\")   Wt 81.6 kg (180 lb)   SpO2 97%   BMI 24.41 kg/m²     Please follow-up with PCP regarding blood pressure.    Physical Exam    Constitutional: Awake, alert. Well nourished appearance.    Integumentary: Warm, dry, intact. No obvious rashes.    HENT: Atraumatic, normocephalic.   Respiratory: Non labored respirations .   Cardiovascular: Intact peripheral pulses.    Psychiatric: Normal mood and affect. A&O X3    Ortho Exam  Left ankle: Incisions are completely healed well-approximated.  There is no redness, drainage or tenderness.  No edema noted.  Mildly limited range of motion of the ankle with dorsiflexion, plantarflexion, inversion and eversion.  He is able to move all of his toes.  Capillary refill time is brisk.  Neurovascular intact.  Sensation is intact.    Imaging Results (Most Recent)       Procedure Component Value Units Date/Time    XR Ankle 2 View Left [[id number]] Resulted: 04/03/24 1321     Updated: 04/03/24 1321    Narrative:      X-Ray Report:  Study: X-rays ordered, taken in the office, and reviewed today.   Site: Left ankle xray  Indication: Left ankle ORIF  View: AP/Lateral view(s)  Findings: Intact left ankle ORIF. No evidence of hardware malfunction.   Prior studies available for comparison: yes                       Assessment and Plan   Problem List Items Addressed This Visit    None  Visit Diagnoses       Left foot pain    -  Primary    Relevant Orders    XR Ankle 2 View Left (Completed)    " Aftercare following left ankle ORIF        Relevant Orders    Ambulatory Referral to Physical Therapy Evaluate and treat; Stretching, ROM, Strengthening (Completed)            Follow Up   Return in about 6 weeks (around 5/13/2024).    Patient is a non-smoker, did not discuss options for smoking cessation.     Social History     Socioeconomic History    Marital status: Single   Tobacco Use    Smoking status: Never    Smokeless tobacco: Current     Types: Snuff   Vaping Use    Vaping status: Never Used   Substance and Sexual Activity    Alcohol use: Not Currently    Drug use: Never    Sexual activity: Defer       Patient Instructions   X-rays taken and reviewed with patient.  Fracture is stable and healing routinely.  Hardware is intact.    Order placed for patient to begin physical therapy.  Continue home exercises as well.  May transition into a normal shoe as tolerated.  Lace up brace given in office today for high risk activities.    Follow-up in 6 weeks to evaluate progress with PT.  X-ray needed.  Call for questions, concerns or worsening symptoms.      Patient was given instructions and counseling regarding his condition or for health maintenance advice. Please see specific information pulled into the AVS if appropriate.

## 2024-04-08 NOTE — PATIENT INSTRUCTIONS
X-rays taken and reviewed with patient.  Fracture is stable and healing routinely.  Hardware is intact.    Order placed for patient to begin physical therapy.  Continue home exercises as well.  May transition into a normal shoe as tolerated.  Lace up brace given in office today for high risk activities.    Follow-up in 6 weeks to evaluate progress with PT.  X-ray needed.  Call for questions, concerns or worsening symptoms.

## 2024-05-13 ENCOUNTER — OFFICE VISIT (OUTPATIENT)
Dept: ORTHOPEDIC SURGERY | Facility: CLINIC | Age: 24
End: 2024-05-13
Payer: OTHER MISCELLANEOUS

## 2024-05-13 VITALS — WEIGHT: 180 LBS | HEIGHT: 72 IN | BODY MASS INDEX: 24.38 KG/M2

## 2024-05-13 DIAGNOSIS — M25.572 LEFT ANKLE PAIN, UNSPECIFIED CHRONICITY: ICD-10-CM

## 2024-05-13 DIAGNOSIS — Z47.89 AFTERCARE FOLLOWING SURGERY OF THE MUSCULOSKELETAL SYSTEM: ICD-10-CM

## 2024-05-13 DIAGNOSIS — M79.672 LEFT FOOT PAIN: Primary | ICD-10-CM

## 2024-05-13 PROCEDURE — 99213 OFFICE O/P EST LOW 20 MIN: CPT

## 2024-05-13 NOTE — PROGRESS NOTES
"Chief Complaint  Pain and Follow-up of the Left Ankle    Subjective      Eugenio Echols presents to [facility] for follow-up of left ankle ORIF with Dr. Kirkland on 1/15/2024.  Patient presents today in a normal shoe.  He is attending physical therapy with [facility] and doing very well.  Reports that they have not worked on very strenuous activities yet such as climbing ladders and running.  Reports that he went to a concert recently and was in a boot for several hours and on his feet and experienced some swelling and aching from the ankle after.  This has resolved.  He is here today for follow-up.    Objective   Allergies   Allergen Reactions    Doxycycline Shortness Of Breath       Vital Signs:   Ht 182.9 cm (72\")   Wt 81.6 kg (180 lb)   BMI 24.41 kg/m²       Physical Exam    Constitutional: Awake, alert. Well nourished appearance.    Integumentary: Warm, dry, intact. No obvious rashes.    HENT: Atraumatic, normocephalic.   Respiratory: Non labored respirations .   Cardiovascular: Intact peripheral pulses.    Psychiatric: Normal mood and affect. A&O X3    Ortho Exam  Left ankle: Incisions are completely healed well-approximated.  Full range of motion noted with dorsiflexion and plantarflexion, near full range of motion noted with inversion and eversion.  He is able to move all of his toes.  CRT is brisk.  Neurovascular intact.  Sensation is intact.  Nontender to palpation.  No edema noted.    Imaging Results (Most Recent)       Procedure Component Value Units Date/Time    XR Ankle 3+ View Left [[id number]] Resulted: 05/13/24 1231     Updated: 05/13/24 1231    Narrative:      X-Ray Report:  Study: X-rays ordered, taken in the office, and reviewed today.   Site: Left ankle xray  Indication: Left ankle ORIF  View: AP/Lateral view(s)  Findings: Intact left ankle ORIF. No evidence of hardware malfunction.    Fractures are stable and near completely healed.  Prior studies available for comparison: " yes                       Assessment and Plan   Problem List Items Addressed This Visit    None  Visit Diagnoses       Left foot pain    -  Primary    Left ankle pain, unspecified chronicity        Relevant Orders    XR Ankle 3+ View Left (Completed)    Aftercare following left ankle ORIF        Relevant Orders    Ambulatory Referral to Physical Therapy (Completed)            Follow Up   Return in about 6 weeks (around 6/24/2024).    Patient is a non-smoker, did not discuss options for smoking cessation.     Social History     Socioeconomic History    Marital status: Single   Tobacco Use    Smoking status: Never    Smokeless tobacco: Current     Types: Snuff   Vaping Use    Vaping status: Never Used   Substance and Sexual Activity    Alcohol use: Not Currently    Drug use: Never    Sexual activity: Defer       Patient Instructions   X-rays taken and reviewed with patient.  Fractures are near completely healed.  Hardware is intact.    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  We discussed continuing PT for evaluation of return to work status and to get back into more strenuous activities.  Patient is in agreement.  Order was updated today in office.    Patient given a work note today to return to work in 2 weeks.  Discussed with patient that during these 2 weeks if he does not feel comfortable recreating a work environment with physical therapist to call for an updated work note to keep him off.  Patient verbalized understanding.    Follow-up in 6 weeks to evaluate progress with PT and the need to continue.  No x-rays needed as fracture is near completely healed.  Call for questions, concerns or worsening symptoms.  Patient was given instructions and counseling regarding his condition or for health maintenance advice. Please see specific information pulled into the AVS if appropriate.

## 2024-05-13 NOTE — PATIENT INSTRUCTIONS
X-rays taken and reviewed with patient.  Fractures are near completely healed.  Hardware is intact.    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  We discussed continuing PT for evaluation of return to work status and to get back into more strenuous activities.  Patient is in agreement.  Order was updated today in office.    Patient given a work note today to return to work in 2 weeks.  Discussed with patient that during these 2 weeks if he does not feel comfortable recreating a work environment with physical therapist to call for an updated work note to keep him off.  Patient verbalized understanding.    Follow-up in 6 weeks to evaluate progress with PT and the need to continue.  No x-rays needed as fracture is near completely healed.  Call for questions, concerns or worsening symptoms.

## 2024-06-26 ENCOUNTER — OFFICE VISIT (OUTPATIENT)
Dept: FAMILY MEDICINE CLINIC | Facility: CLINIC | Age: 24
End: 2024-06-26
Payer: COMMERCIAL

## 2024-06-26 VITALS
TEMPERATURE: 98.3 F | DIASTOLIC BLOOD PRESSURE: 80 MMHG | HEIGHT: 72 IN | OXYGEN SATURATION: 99 % | SYSTOLIC BLOOD PRESSURE: 110 MMHG | HEART RATE: 88 BPM | WEIGHT: 179.5 LBS | BODY MASS INDEX: 24.31 KG/M2

## 2024-06-26 DIAGNOSIS — R53.83 FATIGUE, UNSPECIFIED TYPE: ICD-10-CM

## 2024-06-26 DIAGNOSIS — R68.82 DECREASED LIBIDO: ICD-10-CM

## 2024-06-26 DIAGNOSIS — Z92.241 HISTORY OF ANABOLIC STEROID USE: Primary | ICD-10-CM

## 2024-06-26 PROCEDURE — 84402 ASSAY OF FREE TESTOSTERONE: CPT | Performed by: FAMILY MEDICINE

## 2024-06-26 PROCEDURE — 84403 ASSAY OF TOTAL TESTOSTERONE: CPT | Performed by: FAMILY MEDICINE

## 2024-06-26 PROCEDURE — 99213 OFFICE O/P EST LOW 20 MIN: CPT | Performed by: FAMILY MEDICINE

## 2024-06-26 PROCEDURE — 82672 ASSAY OF ESTROGEN: CPT | Performed by: FAMILY MEDICINE

## 2024-06-26 NOTE — PROGRESS NOTES
Venipuncture Blood Specimen Collection  Venipuncture performed in left arm  by Stacy Richardson with good hemostasis. Patient tolerated the procedure well without complications.   06/26/24   Stacy Richardson

## 2024-06-26 NOTE — PROGRESS NOTES
"Chief Complaint  Anorexia (No appetite x one week ) and Fatigue (Would like testosterone checked )    Subjective      Eugenio Echols is a 24 y.o. male who presents to [facility]     Lack of appetite for 1 week. He said he normally eats a lot, eats about 4000 calories a day and has tried to gain weight. But about the past week or two he's struggled to have appetite to eat anything. Fatigue for the same length of time. Says he can sleep for 10 hours and still feel tired. Reports a lack of sex drive which he says is not normal for him. Not on any medications. He says he snores very minimally and quietly when he does. Nausea in the mornings and he says that's been true for a long time. Reports no major increased stress - he went back to work a couple months ago after a broken leg, says it's hard work but normal for him, he's done it for a couple years. Noticed more irritability as well.    He used anabolic steroids about 8 months ago - he used them for 8 weeks. He's worried this is causing his symptoms and wants testosterone and estrogen levels checked.    Objective   Vital Signs:   Vitals:    06/26/24 0804   BP: 110/80   Pulse: 88   Temp: 98.3 °F (36.8 °C)   SpO2: 99%   Weight: 81.4 kg (179 lb 8 oz)   Height: 182.9 cm (72\")     Body mass index is 24.34 kg/m².    Wt Readings from Last 3 Encounters:   06/26/24 81.4 kg (179 lb 8 oz)   05/13/24 81.6 kg (180 lb)   04/01/24 81.6 kg (180 lb)     BP Readings from Last 3 Encounters:   06/26/24 110/80   04/01/24 143/78   03/20/24 122/70       Health Maintenance   Topic Date Due    HEPATITIS C SCREENING  Never done    COVID-19 Vaccine (1 - 2023-24 season) 06/28/2024 (Originally 9/1/2023)    INFLUENZA VACCINE  08/01/2024    ANNUAL PHYSICAL  03/08/2025    TDAP/TD VACCINES (3 - Td or Tdap) 03/08/2034    Pneumococcal Vaccine 0-64  Completed    MENINGOCOCCAL VACCINE  Completed       Physical Exam  Vitals and nursing note reviewed.   Constitutional:  "      General: He is not in acute distress.  Cardiovascular:      Rate and Rhythm: Normal rate and regular rhythm.      Heart sounds: Normal heart sounds.   Pulmonary:      Effort: Pulmonary effort is normal. No respiratory distress.      Breath sounds: Normal breath sounds.   Neurological:      Mental Status: He is alert.   Psychiatric:         Mood and Affect: Mood normal.         Behavior: Behavior normal.          Result Review :  The following data was reviewed by: Edmund Segura MD on 06/26/2024:       No visits with results within 1 Month(s) from this visit.   Latest known visit with results is:   Office Visit on 03/08/2024   Component Date Value    Glucose 03/08/2024 91     BUN 03/08/2024 16     Creatinine 03/08/2024 1.11     Sodium 03/08/2024 139     Potassium 03/08/2024 4.7     Chloride 03/08/2024 102     CO2 03/08/2024 26.7     Calcium 03/08/2024 9.4     Total Protein 03/08/2024 7.0     Albumin 03/08/2024 4.4     ALT (SGPT) 03/08/2024 23     AST (SGOT) 03/08/2024 30     Alkaline Phosphatase 03/08/2024 69     Total Bilirubin 03/08/2024 0.4     Globulin 03/08/2024 2.6     A/G Ratio 03/08/2024 1.7     BUN/Creatinine Ratio 03/08/2024 14.4     Anion Gap 03/08/2024 10.3     eGFR 03/08/2024 96.9     Hemoglobin A1C 03/08/2024 4.80     TSH 03/08/2024 1.750     Free T4 03/08/2024 1.67     WBC 03/08/2024 6.99     RBC 03/08/2024 5.43     Hemoglobin 03/08/2024 16.1     Hematocrit 03/08/2024 48.6     MCV 03/08/2024 89.5     MCH 03/08/2024 29.7     MCHC 03/08/2024 33.1     RDW 03/08/2024 12.1 (L)     RDW-SD 03/08/2024 39.5     MPV 03/08/2024 9.3     Platelets 03/08/2024 236     Neutrophil % 03/08/2024 36.2 (L)     Lymphocyte % 03/08/2024 38.6     Monocyte % 03/08/2024 5.6     Eosinophil % 03/08/2024 18.6 (H)     Basophil % 03/08/2024 0.9     Immature Grans % 03/08/2024 0.1     Neutrophils, Absolute 03/08/2024 2.53     Lymphocytes, Absolute 03/08/2024 2.70     Monocytes, Absolute 03/08/2024 0.39     Eosinophils,  Absolute 03/08/2024 1.30 (H)     Basophils, Absolute 03/08/2024 0.06     Immature Grans, Absolute 03/08/2024 0.01     nRBC 03/08/2024 0.0      Procedures          Assessment & Plan  History of anabolic steroid use  Will check testosterone levels given history of anabolic steroid use along with fatigue and loss of libido.  He wanted to check his estrogen levels as well.  I explained the in my research I did not think monitoring the estrogen levels was fully necessary, since we are already checking the testosterone, but he was really interested in checking it so I placed that order.    We previously reviewed other labs including CBC, CMP, TSH which showed no significant abnormalities back in March.  Fatigue, unspecified type    Decreased libido      Orders Placed This Encounter   Procedures    Testosterone, Free, Total    Estrogens, Total             BMI is within normal parameters. No other follow-up for BMI required.         FOLLOW UP  Return if symptoms worsen or fail to improve.  Patient was given instructions and counseling regarding his condition or for health maintenance advice. Please see specific information pulled into the AVS if appropriate.       Edmund Segura MD  06/26/24  08:24 EDT    CURRENT & DISCONTINUED MEDICATIONS  Current Outpatient Medications   Medication Instructions    NON FORMULARY Pt taking an OTC med called SLIN for blood sugar . Unsure of dose        There are no discontinued medications.

## 2024-06-30 LAB
TESTOST FREE SERPL-MCNC: 17.4 PG/ML (ref 9.3–26.5)
TESTOST SERPL-MCNC: 360 NG/DL (ref 264–916)

## 2024-07-02 LAB — ESTROGEN SERPL-MCNC: 178 PG/ML (ref 56–213)

## 2024-10-18 ENCOUNTER — OFFICE VISIT (OUTPATIENT)
Dept: FAMILY MEDICINE CLINIC | Facility: CLINIC | Age: 24
End: 2024-10-18
Payer: COMMERCIAL

## 2024-10-18 VITALS
HEIGHT: 72 IN | HEART RATE: 59 BPM | OXYGEN SATURATION: 96 % | BODY MASS INDEX: 25.33 KG/M2 | WEIGHT: 187 LBS | DIASTOLIC BLOOD PRESSURE: 70 MMHG | SYSTOLIC BLOOD PRESSURE: 147 MMHG

## 2024-10-18 DIAGNOSIS — R73.01 ELEVATED FASTING BLOOD SUGAR: Primary | ICD-10-CM

## 2024-10-18 DIAGNOSIS — R03.0 ELEVATED BLOOD PRESSURE, SITUATIONAL: ICD-10-CM

## 2024-10-18 DIAGNOSIS — Z00.00 PREVENTATIVE HEALTH CARE: ICD-10-CM

## 2024-10-18 LAB
HBA1C MFR BLD: 4.8 % (ref 4.8–5.6)
HCV AB SER QL: NORMAL

## 2024-10-18 PROCEDURE — 99213 OFFICE O/P EST LOW 20 MIN: CPT | Performed by: FAMILY MEDICINE

## 2024-10-18 PROCEDURE — 86803 HEPATITIS C AB TEST: CPT | Performed by: FAMILY MEDICINE

## 2024-10-18 PROCEDURE — 83036 HEMOGLOBIN GLYCOSYLATED A1C: CPT | Performed by: FAMILY MEDICINE

## 2024-10-18 PROCEDURE — 36415 COLL VENOUS BLD VENIPUNCTURE: CPT | Performed by: FAMILY MEDICINE

## 2024-10-18 NOTE — PROGRESS NOTES
"Chief Complaint  Follow-up and Labs Only (Needs a A1C lab done )    Subjective      Eugenio Echols is a 24 y.o. male who presents to [facility]     Follow-up. He reports he's been checking his BP in the mornings and it is 120s-130s in the morning. But then when he eats lunch it might be .  Highest it has been is around 1 55-1 60.  He says he's had elevated blood sugars in the past. A1c was normal at 4.80 when last checked 7 months ago.    Blood pressure slightly elevated today. The last time he was checking BP consistently at home, it was on the lower end, like 100/55. Haven't checked it in a while.     Objective   Vital Signs:   Vitals:    10/18/24 1419   BP: 147/70   Pulse: 59   SpO2: 96%   Weight: 84.8 kg (187 lb)   Height: 182.9 cm (72\")     Body mass index is 25.36 kg/m².    Wt Readings from Last 3 Encounters:   10/18/24 84.8 kg (187 lb)   06/26/24 81.4 kg (179 lb 8 oz)   05/13/24 81.6 kg (180 lb)     BP Readings from Last 3 Encounters:   10/18/24 147/70   06/26/24 110/80   04/01/24 143/78       Health Maintenance   Topic Date Due    HEPATITIS C SCREENING  Never done    COVID-19 Vaccine (1 - 2023-24 season) Never done    INFLUENZA VACCINE  03/31/2025 (Originally 8/1/2024)    ANNUAL PHYSICAL  03/08/2025    BMI FOLLOWUP  05/13/2025    TDAP/TD VACCINES (3 - Td or Tdap) 03/08/2034    Pneumococcal Vaccine 0-64  Completed    MENINGOCOCCAL VACCINE  Completed       Physical Exam  Vitals and nursing note reviewed.   Constitutional:       General: He is not in acute distress.  Cardiovascular:      Rate and Rhythm: Normal rate and regular rhythm.      Heart sounds: Normal heart sounds.   Pulmonary:      Effort: Pulmonary effort is normal. No respiratory distress.      Breath sounds: Normal breath sounds.   Neurological:      Mental Status: He is alert.   Psychiatric:         Mood and Affect: Mood normal.         Behavior: Behavior normal.          Result Review :  The following " data was reviewed by: Edmund Segura MD on 10/18/2024:         Procedures          Assessment & Plan  Elevated fasting blood sugar  Noted elevated fasting blood sugar.  He had a normal A1c 7 months ago.  I advised him that he does not need to check his blood sugar regularly nor do we need to get a new A1c as with the A1c of 4.8 when last checked 7 months ago, I did not think he needed 1 today.  But he was interested in ordering 1 and I agreed it was not unreasonable to check an A1c today.  Preventative health care  Checking hep C screening  Elevated blood pressure, situational  Blood pressure is slightly elevated today but has been normotensive at the last visit and at home when he has checked it previously.  I recommend that he check his blood pressure at home regularly a couple times a week and message me on MyChart with the readings in a couple weeks.  If blood pressures consistently elevated over 140/90, we will need to start an antihypertensive.  But if blood pressure is consistently below 140/90, we can be relieved that his blood pressure is normally normotensive.    Orders Placed This Encounter   Procedures    Hemoglobin A1c    Hepatitis C Antibody                       FOLLOW UP  Return if symptoms worsen or fail to improve.  Patient was given instructions and counseling regarding his condition or for health maintenance advice. Please see specific information pulled into the AVS if appropriate.       Edmund Segura MD  10/18/24  14:36 EDT    CURRENT & DISCONTINUED MEDICATIONS  Current Outpatient Medications   Medication Instructions    NON FORMULARY Pt taking an OTC med called SLIN for blood sugar . Unsure of dose       There are no discontinued medications.

## 2024-10-18 NOTE — PROGRESS NOTES
..  Venipuncture Blood Specimen Collection  Venipuncture performed in LT arm by Sara Espino MA with good hemostasis. Patient tolerated the procedure well without complications.   10/18/24   Sara Espino MA

## 2025-03-04 ENCOUNTER — OFFICE VISIT (OUTPATIENT)
Dept: FAMILY MEDICINE CLINIC | Facility: CLINIC | Age: 25
End: 2025-03-04
Payer: COMMERCIAL

## 2025-03-04 VITALS
DIASTOLIC BLOOD PRESSURE: 70 MMHG | OXYGEN SATURATION: 100 % | SYSTOLIC BLOOD PRESSURE: 102 MMHG | BODY MASS INDEX: 25.61 KG/M2 | TEMPERATURE: 98.2 F | HEART RATE: 89 BPM | WEIGHT: 188.8 LBS

## 2025-03-04 DIAGNOSIS — R10.30 LOWER ABDOMINAL PAIN: Primary | ICD-10-CM

## 2025-03-04 DIAGNOSIS — K59.00 CONSTIPATION, UNSPECIFIED CONSTIPATION TYPE: ICD-10-CM

## 2025-03-04 PROCEDURE — 99213 OFFICE O/P EST LOW 20 MIN: CPT | Performed by: FAMILY MEDICINE

## 2025-03-04 RX ORDER — POLYETHYLENE GLYCOL 3350 17 G/17G
17 POWDER, FOR SOLUTION ORAL DAILY PRN
Qty: 238 G | Refills: 1 | Status: SHIPPED | OUTPATIENT
Start: 2025-03-04

## 2025-03-04 NOTE — PROGRESS NOTES
Chief Complaint  Abdominal Pain (Abdominal pain after meals x 2.5 days .) and Constipation (Bowel movements not as frequent. )    Subjective      Eugenio Echols is a 24 y.o. male who presents to [facility]     Abdominal pain after eating for the past 2-1/2 days.  Constipation as well.  He reports his bowel movements have not been as frequent lately. Like a cramping pain in his lower abdomen. Usually has Bms 2-3 times per day, last couple days it feels like he can't get anything out. LBM was this morning but it's been hard and small lately. No problems urinating. Has had some nausea and has thrown up yesterday and the day before. No blood in the stool or vomit. Says that dairy products do make it worse but they constipate him, no diarrhea. No fever.    Objective   Vital Signs:   Vitals:    03/04/25 1118   BP: 102/70   Pulse: 89   Temp: 98.2 °F (36.8 °C)   SpO2: 100%   Weight: 85.6 kg (188 lb 12.8 oz)     Body mass index is 25.61 kg/m².    Wt Readings from Last 3 Encounters:   03/04/25 85.6 kg (188 lb 12.8 oz)   10/18/24 84.8 kg (187 lb)   06/26/24 81.4 kg (179 lb 8 oz)     BP Readings from Last 3 Encounters:   03/04/25 102/70   10/18/24 147/70   06/26/24 110/80       Health Maintenance   Topic Date Due    COVID-19 Vaccine (1 - 2024-25 season) Never done    INFLUENZA VACCINE  03/31/2025 (Originally 7/1/2024)    ANNUAL PHYSICAL  03/08/2025    BMI FOLLOWUP  05/13/2025    TDAP/TD VACCINES (3 - Td or Tdap) 03/08/2034    HEPATITIS C SCREENING  Completed    Pneumococcal Vaccine 0-49  Completed    MENINGOCOCCAL VACCINE  Completed       Physical Exam  Vitals and nursing note reviewed.   Constitutional:       General: He is not in acute distress.  Cardiovascular:      Rate and Rhythm: Normal rate.   Pulmonary:      Effort: Pulmonary effort is normal. No respiratory distress.   Abdominal:      General: There is no distension.      Palpations: Abdomen is soft. There is no mass.      Comments:  Diffuse lower abdominal tenderness bilaterally, no upper abdominal tenderness   Neurological:      Mental Status: He is alert.   Psychiatric:         Mood and Affect: Mood normal.         Behavior: Behavior normal.          Result Review :  The following data was reviewed by: Edmund Segura MD on 03/04/2025:         Procedures          Assessment & Plan  Lower abdominal pain  KUB obtained in clinic today.  Will await official read and call patient with results.    Looks to have some constipation on KUB.  Suspect constipation is the cause of his abdominal pain.  I sent in a course of MiraLAX to be taken once daily as needed.  Also encouraged hydration and activity.  Follow-up if worsening or if no improvement.  Orders:    XR Abdomen KUB (In Office)    Constipation, unspecified constipation type    Orders:    XR Abdomen KUB (In Office)    polyethylene glycol (MiraLax) 17 GM/SCOOP powder; Take 17 g by mouth Daily As Needed (constipation).                   FOLLOW UP  Return if symptoms worsen or fail to improve.  Patient was given instructions and counseling regarding his condition or for health maintenance advice. Please see specific information pulled into the AVS if appropriate.       Edmund Segura MD  03/04/25  12:50 EST    CURRENT & DISCONTINUED MEDICATIONS  Current Outpatient Medications   Medication Instructions    NON FORMULARY Pt taking an OTC med called SLIN for blood sugar . Unsure of dose    polyethylene glycol (MIRALAX) 17 g, Oral, Daily PRN       There are no discontinued medications.

## 2025-03-11 ENCOUNTER — OFFICE VISIT (OUTPATIENT)
Dept: FAMILY MEDICINE CLINIC | Facility: CLINIC | Age: 25
End: 2025-03-11
Payer: COMMERCIAL

## 2025-03-11 VITALS
TEMPERATURE: 98.6 F | SYSTOLIC BLOOD PRESSURE: 134 MMHG | BODY MASS INDEX: 25.6 KG/M2 | WEIGHT: 189 LBS | HEART RATE: 103 BPM | HEIGHT: 72 IN | OXYGEN SATURATION: 100 % | DIASTOLIC BLOOD PRESSURE: 76 MMHG

## 2025-03-11 DIAGNOSIS — R10.84 GENERALIZED ABDOMINAL PAIN: Primary | ICD-10-CM

## 2025-03-11 LAB
ALBUMIN SERPL-MCNC: 4 G/DL (ref 3.5–5.2)
ALBUMIN/GLOB SERPL: 1.7 G/DL
ALP SERPL-CCNC: 83 U/L (ref 39–117)
ALT SERPL W P-5'-P-CCNC: 27 U/L (ref 1–41)
ANION GAP SERPL CALCULATED.3IONS-SCNC: 11 MMOL/L (ref 5–15)
ANISOCYTOSIS BLD QL: ABNORMAL
AST SERPL-CCNC: 32 U/L (ref 1–40)
BASOPHILS # BLD MANUAL: 0.12 10*3/MM3 (ref 0–0.2)
BASOPHILS NFR BLD MANUAL: 1 % (ref 0–1.5)
BILIRUB SERPL-MCNC: 0.2 MG/DL (ref 0–1.2)
BUN SERPL-MCNC: 25 MG/DL (ref 6–20)
BUN/CREAT SERPL: 16.7 (ref 7–25)
CALCIUM SPEC-SCNC: 9.3 MG/DL (ref 8.6–10.5)
CHLORIDE SERPL-SCNC: 103 MMOL/L (ref 98–107)
CO2 SERPL-SCNC: 26 MMOL/L (ref 22–29)
CREAT SERPL-MCNC: 1.5 MG/DL (ref 0.76–1.27)
DEPRECATED RDW RBC AUTO: 40.3 FL (ref 37–54)
EGFRCR SERPLBLD CKD-EPI 2021: 67.1 ML/MIN/1.73
EOSINOPHIL # BLD MANUAL: 4.69 10*3/MM3 (ref 0–0.4)
EOSINOPHIL NFR BLD MANUAL: 38.5 % (ref 0.3–6.2)
ERYTHROCYTE [DISTWIDTH] IN BLOOD BY AUTOMATED COUNT: 11.8 % (ref 12.3–15.4)
GLOBULIN UR ELPH-MCNC: 2.4 GM/DL
GLUCOSE SERPL-MCNC: 75 MG/DL (ref 65–99)
HCT VFR BLD AUTO: 48.1 % (ref 37.5–51)
HGB BLD-MCNC: 16 G/DL (ref 13–17.7)
LIPASE SERPL-CCNC: 41 U/L (ref 13–60)
LYMPHOCYTES # BLD MANUAL: 1.78 10*3/MM3 (ref 0.7–3.1)
LYMPHOCYTES NFR BLD MANUAL: 0 % (ref 5–12)
MCH RBC QN AUTO: 30.9 PG (ref 26.6–33)
MCHC RBC AUTO-ENTMCNC: 33.3 G/DL (ref 31.5–35.7)
MCV RBC AUTO: 92.9 FL (ref 79–97)
MONOCYTES # BLD: 0 10*3/MM3 (ref 0.1–0.9)
NEUTROPHILS # BLD AUTO: 5.57 10*3/MM3 (ref 1.7–7)
NEUTROPHILS NFR BLD MANUAL: 45.8 % (ref 42.7–76)
NRBC BLD AUTO-RTO: 0 /100 WBC (ref 0–0.2)
PLAT MORPH BLD: NORMAL
PLATELET # BLD AUTO: 242 10*3/MM3 (ref 140–450)
PMV BLD AUTO: 9.3 FL (ref 6–12)
POTASSIUM SERPL-SCNC: 4.4 MMOL/L (ref 3.5–5.2)
PROT SERPL-MCNC: 6.4 G/DL (ref 6–8.5)
RBC # BLD AUTO: 5.18 10*6/MM3 (ref 4.14–5.8)
SODIUM SERPL-SCNC: 140 MMOL/L (ref 136–145)
VARIANT LYMPHS NFR BLD MANUAL: 14.6 % (ref 19.6–45.3)
WBC MORPH BLD: NORMAL
WBC NRBC COR # BLD AUTO: 12.17 10*3/MM3 (ref 3.4–10.8)

## 2025-03-11 PROCEDURE — 80053 COMPREHEN METABOLIC PANEL: CPT | Performed by: FAMILY MEDICINE

## 2025-03-11 PROCEDURE — 99213 OFFICE O/P EST LOW 20 MIN: CPT | Performed by: FAMILY MEDICINE

## 2025-03-11 PROCEDURE — 83690 ASSAY OF LIPASE: CPT | Performed by: FAMILY MEDICINE

## 2025-03-11 PROCEDURE — 85007 BL SMEAR W/DIFF WBC COUNT: CPT | Performed by: FAMILY MEDICINE

## 2025-03-11 PROCEDURE — 36415 COLL VENOUS BLD VENIPUNCTURE: CPT | Performed by: FAMILY MEDICINE

## 2025-03-11 PROCEDURE — 85025 COMPLETE CBC W/AUTO DIFF WBC: CPT | Performed by: FAMILY MEDICINE

## 2025-03-11 NOTE — PROGRESS NOTES
"Chief Complaint  Abdominal Pain (Continues to have abdominal pain (feels like it has gotten a little worse since last visit).  Is taking the Miralax and Fiber supplements but has not noticed an improvement.)    Subjective      Eugenio Echols is a 24 y.o. male who presents to [facility]     Follow-up for abdominal pain.  I saw him 1 week ago for crampy lower abdominal pain and constipation with hard small stools.  At that time he got a KUB which showed mild stool throughout the colon, I had prescribed MiraLAX and told him to follow-up if worsening or if no improvement.    He reports he is continuing to have the abdominal pain and thinks it is gotten a little worse since last visit.  He is taking the MiraLAX and fiber supplements but has not noticed any improvement - he actually had some diarrhea over the weekend.  Still no urinary symptoms. No fever. He says when he eats certain foods it \"makes it 10x worse\". Eating hard boiled eggs in the morning give him terrible stomach aches. Yogurt, tuna, and protein shakes trigger it as well. This has only been going on for a week and a half - he's had the same diet for probably the past year and it hasn't caused him any issues until recently. And eating anything makes it worse - just certain foods make it much worse. Gets very bloated. Abdomen is painful and crampy and bloaty all over. Has thrown up a couple times due to this. No blood in the stool or vomit.     Objective   Vital Signs:   Vitals:    03/11/25 1426   BP: 134/76   BP Location: Left arm   Patient Position: Sitting   Cuff Size: Adult   Pulse: 103   Temp: 98.6 °F (37 °C)   TempSrc: Temporal   SpO2: 100%   Weight: 85.7 kg (189 lb)   Height: 182.9 cm (72\")     Body mass index is 25.63 kg/m².    Wt Readings from Last 3 Encounters:   03/11/25 85.7 kg (189 lb)   03/04/25 85.6 kg (188 lb 12.8 oz)   10/18/24 84.8 kg (187 lb)     BP Readings from Last 3 Encounters:   03/11/25 134/76 "   03/04/25 102/70   10/18/24 147/70       Health Maintenance   Topic Date Due    MENINGOCOCCAL B VACCINE (1 of 2 - Standard) Never done    ANNUAL PHYSICAL  03/08/2025    INFLUENZA VACCINE  03/31/2025 (Originally 7/1/2024)    COVID-19 Vaccine (1 - 2024-25 season) 03/11/2026 (Originally 9/1/2024)    BMI FOLLOWUP  05/13/2025    TDAP/TD VACCINES (3 - Td or Tdap) 03/08/2034    HEPATITIS C SCREENING  Completed    Pneumococcal Vaccine 0-49  Completed    MENINGOCOCCAL VACCINE  Completed       Physical Exam  Vitals and nursing note reviewed.   Constitutional:       General: He is not in acute distress.  Cardiovascular:      Rate and Rhythm: Normal rate and regular rhythm.      Heart sounds: Normal heart sounds.   Pulmonary:      Effort: Pulmonary effort is normal. No respiratory distress.      Breath sounds: Normal breath sounds.   Abdominal:      General: Bowel sounds are normal. There is no distension.      Palpations: Abdomen is soft. There is no mass.      Comments: Left lower quadrant tenderness - no tenderness elsewhere   Neurological:      Mental Status: He is alert.   Psychiatric:         Mood and Affect: Mood normal.         Behavior: Behavior normal.          Result Review :  The following data was reviewed by: Edmund Segura MD on 03/11/2025:       XR Abdomen KUB (In Office)  Result Date: 3/6/2025  Impression: Nonobstructive bowel gas pattern. Mild stool throughout the colon not clearly an abnormal amount. Electronically Signed: Dino Baker MD  3/6/2025 6:12 PM EST  Workstation ID: [id number]    Procedures          Assessment & Plan  Generalized abdominal pain  Lab work ordered including CBC, CMP, lipase to evaluate acute abdominal pain that has been going on for about 1.5 weeks.  I am also getting a stat CT abdomen out of concern for diverticulitis.  He has abdominal pain all over but is crampy accompanied with some constipation that worsens with food intake.  Has tenderness really only in the left lower  quadrant.    Discussed avoiding triggering foods by having a bland diet, staying hydrated, and Tylenol or ibuprofen for pain control.  Also cautioned to avoid regular use of ibuprofen.  Orders:    Comprehensive Metabolic Panel    CBC & Differential    Lipase    CT Abdomen Pelvis With Contrast; Future                   FOLLOW UP  Return if symptoms worsen or fail to improve.  Patient was given instructions and counseling regarding his condition or for health maintenance advice. Please see specific information pulled into the AVS if appropriate.       Edmund Segura MD  03/11/25  14:50 EDT    CURRENT & DISCONTINUED MEDICATIONS  Current Outpatient Medications   Medication Instructions    NON FORMULARY Pt taking an OTC med called SLIN for blood sugar . Unsure of dose    polyethylene glycol (MIRALAX) 17 g, Oral, Daily PRN       There are no discontinued medications.

## 2025-03-11 NOTE — PROGRESS NOTES
Venipuncture Blood Specimen Collection  Venipuncture performed in left arm by Ruben Silver MA with good hemostasis. Patient tolerated the procedure well without complications.   03/11/25   Ruben Silver MA

## 2025-03-19 ENCOUNTER — HOSPITAL ENCOUNTER (EMERGENCY)
Facility: HOSPITAL | Age: 25
Discharge: HOME | End: 2025-03-19
Attending: EMERGENCY MEDICINE | Admitting: EMERGENCY MEDICINE
Payer: COMMERCIAL

## 2025-03-19 ENCOUNTER — APPOINTMENT (OUTPATIENT)
Dept: CT IMAGING | Facility: HOSPITAL | Age: 25
End: 2025-03-19
Payer: COMMERCIAL

## 2025-03-19 VITALS
HEIGHT: 72 IN | DIASTOLIC BLOOD PRESSURE: 41 MMHG | BODY MASS INDEX: 25.74 KG/M2 | OXYGEN SATURATION: 98 % | TEMPERATURE: 98.4 F | HEART RATE: 95 BPM | RESPIRATION RATE: 15 BRPM | WEIGHT: 190.04 LBS | SYSTOLIC BLOOD PRESSURE: 95 MMHG

## 2025-03-19 DIAGNOSIS — R11.2 NAUSEA AND VOMITING, UNSPECIFIED VOMITING TYPE: Primary | ICD-10-CM

## 2025-03-19 DIAGNOSIS — I88.0 MESENTERIC ADENITIS: ICD-10-CM

## 2025-03-19 DIAGNOSIS — R19.7 DIARRHEA, UNSPECIFIED TYPE: ICD-10-CM

## 2025-03-19 LAB
ALBUMIN SERPL-MCNC: 4 G/DL (ref 3.5–5.2)
ALBUMIN/GLOB SERPL: 1.5 G/DL
ALP SERPL-CCNC: 81 U/L (ref 39–117)
ALT SERPL W P-5'-P-CCNC: 44 U/L (ref 1–41)
ANION GAP SERPL CALCULATED.3IONS-SCNC: 10.9 MMOL/L (ref 5–15)
AST SERPL-CCNC: 32 U/L (ref 1–40)
BASOPHILS # BLD AUTO: 0.02 10*3/MM3 (ref 0–0.2)
BASOPHILS NFR BLD AUTO: 0.2 % (ref 0–1.5)
BILIRUB SERPL-MCNC: 0.5 MG/DL (ref 0–1.2)
BILIRUB UR QL STRIP: NEGATIVE
BUN SERPL-MCNC: 24 MG/DL (ref 6–20)
BUN/CREAT SERPL: 21.1 (ref 7–25)
CALCIUM SPEC-SCNC: 9.1 MG/DL (ref 8.6–10.5)
CHLORIDE SERPL-SCNC: 102 MMOL/L (ref 98–107)
CLARITY UR: CLEAR
CO2 SERPL-SCNC: 25.1 MMOL/L (ref 22–29)
COLOR UR: YELLOW
CREAT SERPL-MCNC: 1.14 MG/DL (ref 0.76–1.27)
DEPRECATED RDW RBC AUTO: 41.3 FL (ref 37–54)
EGFRCR SERPLBLD CKD-EPI 2021: 93.3 ML/MIN/1.73
EOSINOPHIL # BLD AUTO: 1.38 10*3/MM3 (ref 0–0.4)
EOSINOPHIL NFR BLD AUTO: 14.9 % (ref 0.3–6.2)
ERYTHROCYTE [DISTWIDTH] IN BLOOD BY AUTOMATED COUNT: 12.5 % (ref 12.3–15.4)
GLOBULIN UR ELPH-MCNC: 2.6 GM/DL
GLUCOSE SERPL-MCNC: 106 MG/DL (ref 65–99)
GLUCOSE UR STRIP-MCNC: NEGATIVE MG/DL
HCT VFR BLD AUTO: 46.7 % (ref 37.5–51)
HGB BLD-MCNC: 15.8 G/DL (ref 13–17.7)
HGB UR QL STRIP.AUTO: NEGATIVE
HOLD SPECIMEN: NORMAL
HOLD SPECIMEN: NORMAL
IMM GRANULOCYTES # BLD AUTO: 0.01 10*3/MM3 (ref 0–0.05)
IMM GRANULOCYTES NFR BLD AUTO: 0.1 % (ref 0–0.5)
KETONES UR QL STRIP: ABNORMAL
LEUKOCYTE ESTERASE UR QL STRIP.AUTO: NEGATIVE
LIPASE SERPL-CCNC: 32 U/L (ref 13–60)
LYMPHOCYTES # BLD AUTO: 0.49 10*3/MM3 (ref 0.7–3.1)
LYMPHOCYTES NFR BLD AUTO: 5.3 % (ref 19.6–45.3)
MCH RBC QN AUTO: 30.4 PG (ref 26.6–33)
MCHC RBC AUTO-ENTMCNC: 33.8 G/DL (ref 31.5–35.7)
MCV RBC AUTO: 90 FL (ref 79–97)
MONOCYTES # BLD AUTO: 0.32 10*3/MM3 (ref 0.1–0.9)
MONOCYTES NFR BLD AUTO: 3.4 % (ref 5–12)
NEUTROPHILS NFR BLD AUTO: 7.06 10*3/MM3 (ref 1.7–7)
NEUTROPHILS NFR BLD AUTO: 76.1 % (ref 42.7–76)
NITRITE UR QL STRIP: NEGATIVE
NRBC BLD AUTO-RTO: 0 /100 WBC (ref 0–0.2)
PH UR STRIP.AUTO: 6.5 [PH] (ref 5–8)
PLATELET # BLD AUTO: 202 10*3/MM3 (ref 140–450)
PMV BLD AUTO: 8.8 FL (ref 6–12)
POTASSIUM SERPL-SCNC: 4.2 MMOL/L (ref 3.5–5.2)
PROT SERPL-MCNC: 6.6 G/DL (ref 6–8.5)
PROT UR QL STRIP: NEGATIVE
RBC # BLD AUTO: 5.19 10*6/MM3 (ref 4.14–5.8)
SODIUM SERPL-SCNC: 138 MMOL/L (ref 136–145)
SP GR UR STRIP: 1.03 (ref 1–1.03)
UROBILINOGEN UR QL STRIP: ABNORMAL
WBC NRBC COR # BLD AUTO: 9.28 10*3/MM3 (ref 3.4–10.8)
WHOLE BLOOD HOLD COAG: NORMAL
WHOLE BLOOD HOLD SPECIMEN: NORMAL

## 2025-03-19 PROCEDURE — 85025 COMPLETE CBC W/AUTO DIFF WBC: CPT

## 2025-03-19 PROCEDURE — 99285 EMERGENCY DEPT VISIT HI MDM: CPT

## 2025-03-19 PROCEDURE — 25010000002 ONDANSETRON PER 1 MG: Performed by: EMERGENCY MEDICINE

## 2025-03-19 PROCEDURE — 74177 CT ABD & PELVIS W/CONTRAST: CPT

## 2025-03-19 PROCEDURE — 25810000003 SODIUM CHLORIDE 0.9 % SOLUTION: Performed by: REGISTERED NURSE

## 2025-03-19 PROCEDURE — 96374 THER/PROPH/DIAG INJ IV PUSH: CPT

## 2025-03-19 PROCEDURE — 81003 URINALYSIS AUTO W/O SCOPE: CPT | Performed by: EMERGENCY MEDICINE

## 2025-03-19 PROCEDURE — 25510000001 IOPAMIDOL PER 1 ML: Performed by: EMERGENCY MEDICINE

## 2025-03-19 PROCEDURE — 83690 ASSAY OF LIPASE: CPT | Performed by: EMERGENCY MEDICINE

## 2025-03-19 PROCEDURE — 80053 COMPREHEN METABOLIC PANEL: CPT | Performed by: EMERGENCY MEDICINE

## 2025-03-19 RX ORDER — SODIUM CHLORIDE 0.9 % (FLUSH) 0.9 %
10 SYRINGE (ML) INJECTION AS NEEDED
Status: DISCONTINUED | OUTPATIENT
Start: 2025-03-19 | End: 2025-03-20 | Stop reason: HOSPADM

## 2025-03-19 RX ORDER — DICYCLOMINE HYDROCHLORIDE 10 MG/1
10 CAPSULE ORAL 3 TIMES DAILY PRN
Qty: 12 CAPSULE | Refills: 0 | Status: SHIPPED | OUTPATIENT
Start: 2025-03-19

## 2025-03-19 RX ORDER — ONDANSETRON 2 MG/ML
4 INJECTION INTRAMUSCULAR; INTRAVENOUS ONCE
Status: COMPLETED | OUTPATIENT
Start: 2025-03-19 | End: 2025-03-19

## 2025-03-19 RX ORDER — IOPAMIDOL 755 MG/ML
100 INJECTION, SOLUTION INTRAVASCULAR
Status: COMPLETED | OUTPATIENT
Start: 2025-03-19 | End: 2025-03-19

## 2025-03-19 RX ORDER — ONDANSETRON 4 MG/1
4 TABLET, ORALLY DISINTEGRATING ORAL 4 TIMES DAILY PRN
Qty: 10 TABLET | Refills: 0 | Status: SHIPPED | OUTPATIENT
Start: 2025-03-19

## 2025-03-19 RX ADMIN — SODIUM CHLORIDE 1000 ML: 0.9 INJECTION, SOLUTION INTRAVENOUS at 21:11

## 2025-03-19 RX ADMIN — IOPAMIDOL 100 ML: 755 INJECTION, SOLUTION INTRAVENOUS at 22:14

## 2025-03-19 RX ADMIN — ONDANSETRON 4 MG: 2 INJECTION INTRAMUSCULAR; INTRAVENOUS at 21:49

## 2025-03-20 NOTE — ED PROVIDER NOTES
Time: 8:04 PM EDT  Date of encounter:  3/19/2025  Independent Historian/Clinical History and Information was obtained by:   Patient    History is limited by: N/A    Chief Complaint   Patient presents with    Nausea    Vomiting         History of Present Illness:  Patient is a 24 y.o. year old male who presents to the emergency department for evaluation of nausea/vomiting and diarrhea for the past 2 weeks.  Initially started with constipation and took a laxative.  Then it turned to diarrhea.  He has had too many bowel movements to count today.  He is vomited about for 5 times.  He has been seen twice for this and has had a CT scan which showed some inflamed lymph nodes.  NAMRATA Tate    Patient Care Team  Primary Care Provider: Edmund Segura MD    Past Medical History:     Allergies   Allergen Reactions    Doxycycline Shortness Of Breath     History reviewed. No pertinent past medical history.  Past Surgical History:   Procedure Laterality Date    ANKLE OPEN REDUCTION INTERNAL FIXATION Left 1/15/2024    Procedure: ANKLE OPEN REDUCTION INTERNAL FIXATION;  Surgeon: Corby Kirkland MD;  Location: [facility] OR [facility] [place];  Service: Orthopedics;  Laterality: Left;     History reviewed. No pertinent family history.    Home Medications:  Prior to Admission medications    Medication Sig Start Date End Date Taking? Authorizing Provider   NON FORMULARY Pt taking an OTC med called SLIN for blood sugar . Unsure of dose  Patient not taking: Reported on 3/11/2025    Provider, MD Kimi   polyethylene glycol (MiraLax) 17 GM/SCOOP powder Take 17 g by mouth Daily As Needed (constipation). 3/4/25   Edmund Segura MD        Social History:   Social History     Tobacco Use    Smoking status: Never    Smokeless tobacco: Current     Types: Snuff   Vaping Use    Vaping status: Never Used   Substance Use Topics    Alcohol use: Not Currently    Drug use: Never         Review of Systems:  Review of Systems   Constitutional:   "Negative for chills and fever.   HENT:  Negative for congestion, ear pain and sore throat.    Eyes:  Negative for pain.   Respiratory:  Negative for cough, chest tightness and shortness of breath.    Cardiovascular:  Negative for chest pain.   Gastrointestinal:  Positive for abdominal pain, diarrhea, nausea and vomiting.   Genitourinary:  Negative for flank pain and hematuria.   Musculoskeletal:  Negative for joint swelling.   Skin:  Negative for pallor.   Neurological:  Negative for seizures and headaches.   All other systems reviewed and are negative.       Physical Exam:  BP 95/41 (BP Location: Left arm, Patient Position: Sitting)   Pulse 95   Temp 98.4 °F (36.9 °C) (Oral)   Resp 15   Ht 182.9 cm (72\")   Wt 86.2 kg (190 lb 0.6 oz)   SpO2 98%   BMI 25.77 kg/m²         Physical Exam  Vitals and nursing note reviewed.   Constitutional:       General: He is not in acute distress.     Appearance: Normal appearance. He is not toxic-appearing.   HENT:      Head: Normocephalic and atraumatic.      Jaw: There is normal jaw occlusion.   Eyes:      General: Lids are normal.      Extraocular Movements: Extraocular movements intact.      Conjunctiva/sclera: Conjunctivae normal.      Pupils: Pupils are equal, round, and reactive to light.   Cardiovascular:      Rate and Rhythm: Normal rate and regular rhythm.      Pulses: Normal pulses.      Heart sounds: Normal heart sounds.   Pulmonary:      Effort: Pulmonary effort is normal. No respiratory distress.      Breath sounds: Normal breath sounds. No wheezing or rhonchi.   Abdominal:      General: Abdomen is flat. There is no distension.      Palpations: Abdomen is soft.      Tenderness: There is abdominal tenderness. There is no guarding or rebound.      Comments: Diffuse abdominal tenderness, no McBurney's point tenderness   Musculoskeletal:         General: Normal range of motion.      Cervical back: Normal range of motion and neck supple.      Right lower leg: No " edema.      Left lower leg: No edema.   Skin:     General: Skin is warm and dry.      Coloration: Skin is not cyanotic.   Neurological:      Mental Status: He is alert and oriented to person, place, and time. Mental status is at baseline.   Psychiatric:         Attention and Perception: Attention and perception normal.         Mood and Affect: Mood normal.                    Medical Decision Making:      Comorbidities that affect care:    None    External Notes reviewed:    Previous Radiological Studies: CT imaging performed by PCP on 3/11/2025      The following orders were placed and all results were independently analyzed by me:  Orders Placed This Encounter   Procedures    CT Abdomen Pelvis With Contrast    [place] Draw    Comprehensive Metabolic Panel    Lipase    Urinalysis With Microscopic If Indicated (No Culture) - Urine, Clean Catch    CBC Auto Differential    Ambulatory Referral to Gastroenterology    Undress & Gown    CBC & Differential    Green Top (Gel)    Lavender Top    Gold Top - SST    Light Blue Top       Medications Given in the Emergency Department:  Medications   sodium chloride 0.9 % bolus 1,000 mL (0 mL Intravenous Stopped 3/19/25 2141)   ondansetron (ZOFRAN) injection 4 mg (4 mg Intravenous Given 3/19/25 2149)   iopamidol (ISOVUE-370) 76 % injection 100 mL (100 mL Intravenous Given 3/19/25 2214)        ED Course:    The patient was initially evaluated in the triage area where orders were placed. The patient was later dispositioned by Dino Neil MD.      The patient was advised to stay for completion of workup which includes but is not limited to communication of labs and radiological results, reassessment and plan. The patient was advised that leaving prior to disposition by a provider could result in critical findings that are not communicated to the patient.          Labs:    Lab Results (last 24 hours)       Procedure Component Value Units Date/Time    CBC & Differential [[id number]]   (Abnormal) Collected: 03/19/25 2044    Specimen: Blood Updated: 03/19/25 2053    Narrative:      The following orders were created for panel order CBC & Differential.  Procedure                               Abnormality         Status                     ---------                               -----------         ------                     CBC Auto Differential[[id number]]        Abnormal            Final result                 Please view results for these tests on the individual orders.    Comprehensive Metabolic Panel [[id number]]  (Abnormal) Collected: 03/19/25 2044    Specimen: Blood Updated: 03/19/25 2118     Glucose 106 mg/dL      BUN 24 mg/dL      Creatinine 1.14 mg/dL      Sodium 138 mmol/L      Potassium 4.2 mmol/L      Chloride 102 mmol/L      CO2 25.1 mmol/L      Calcium 9.1 mg/dL      Total Protein 6.6 g/dL      Albumin 4.0 g/dL      ALT (SGPT) 44 U/L      AST (SGOT) 32 U/L      Alkaline Phosphatase 81 U/L      Total Bilirubin 0.5 mg/dL      Globulin 2.6 gm/dL      A/G Ratio 1.5 g/dL      BUN/Creatinine Ratio 21.1     Anion Gap 10.9 mmol/L      eGFR 93.3 mL/min/1.73     Narrative:      GFR Categories in Chronic Kidney Disease (CKD)      GFR Category          GFR (mL/min/1.73)    Interpretation  G1                     90 or greater         Normal or high (1)  G2                      60-89                Mild decrease (1)  G3a                   45-59                Mild to moderate decrease  G3b                   30-44                Moderate to severe decrease  G4                    15-29                Severe decrease  G5                    14 or less           Kidney failure          (1)In the absence of evidence of kidney disease, neither GFR category G1 or G2 fulfill the criteria for CKD.    eGFR calculation 2021 CKD-EPI creatinine equation, which does not include race as a factor    Lipase [[id number]]  (Normal) Collected: 03/19/25 2044    Specimen: Blood Updated: 03/19/25 2118     Lipase 32 U/L      CBC Auto Differential [[id number]]  (Abnormal) Collected: 03/19/25 2044    Specimen: Blood Updated: 03/19/25 2053     WBC 9.28 10*3/mm3      RBC 5.19 10*6/mm3      Hemoglobin 15.8 g/dL      Hematocrit 46.7 %      MCV 90.0 fL      MCH 30.4 pg      MCHC 33.8 g/dL      RDW 12.5 %      RDW-SD 41.3 fl      MPV 8.8 fL      Platelets 202 10*3/mm3      Neutrophil % 76.1 %      Lymphocyte % 5.3 %      Monocyte % 3.4 %      Eosinophil % 14.9 %      Basophil % 0.2 %      Immature Grans % 0.1 %      Neutrophils, Absolute 7.06 10*3/mm3      Lymphocytes, Absolute 0.49 10*3/mm3      Monocytes, Absolute 0.32 10*3/mm3      Eosinophils, Absolute 1.38 10*3/mm3      Basophils, Absolute 0.02 10*3/mm3      Immature Grans, Absolute 0.01 10*3/mm3      nRBC 0.0 /100 WBC     Urinalysis With Microscopic If Indicated (No Culture) - Urine, Clean Catch [[id number]]  (Abnormal) Collected: 03/19/25 2112    Specimen: Urine, Clean Catch Updated: 03/19/25 2128     Color, UA Yellow     Appearance, UA Clear     pH, UA 6.5     Specific Gravity, UA 1.026     Glucose, UA Negative     Ketones, UA 15 mg/dL (1+)     Bilirubin, UA Negative     Blood, UA Negative     Protein, UA Negative     Leuk Esterase, UA Negative     Nitrite, UA Negative     Urobilinogen, UA 1.0 E.U./dL    Narrative:      Urine microscopic not indicated.             Imaging:    CT Abdomen Pelvis With Contrast  Result Date: 3/19/2025  CT ABDOMEN PELVIS W CONTRAST Date of Exam: 3/19/2025 10:14 PM EDT Indication: Diffuse abdominal tenderness, persistent vomiting, recent CT 3/11 mesenteric adenitis abdominal pain. Comparison: None available. Technique: Axial CT images were obtained of the abdomen and pelvis after the uneventful intravenous administration of iodinated contrast. Reconstructed coronal and sagittal images were also obtained. Automated exposure control and iterative construction methods were used. FINDINGS: Lung bases: No masses. No consolidation. Liver:No masses. No intrahepatic  biliary ductal dilatation. Spleen:No masses. No perisplenic hematoma. Pancreas:No pancreatic masses. No evidence of pancreatitis. Gallbladder and common bile duct:No evidence of cholelithiasis. No evidence of cholecystitis. Adrenal glands:No adrenal masses Kidneys and ureters:No kidney stones. No renal masses.No calculi present within the ureters. Normal caliber ureters. Urinary bladder:No urinary bladder wall thickening. No bladder masses. Small bowel:Normal caliber small bowel. Large bowel:No diverticulosis or diverticulitis. No large bowel masses are appreciated Appendix: Normal GENITOURINARY: Normal prostate Ascites or pneumoperitoneum:None. Adenopathy: Prominent small bowel mesenteric lymph nodes measuring up to 2.2 cm 1.3 cm. Small bowel mesenteric lymph node on image #110 series 2 measuring 0.8 cm x 1 cm. Osseous structures: The pubic bones are intact. The proximal femurs are intact. The sacrum and sacroiliac joints are normal. Other findings: None     1.No acute findings in the abdomen or pelvis. The adrenal glands are normal in appearance. 2.Prominent small bowel mesenteric lymph nodes as described above. These are nonspecific. Electronically Signed: Jae De La Rosa MD  3/19/2025 10:28 PM EDT  Workstation ID: [id number]        Differential Diagnosis and Discussion:      Abdominal Pain: Based on the patient's signs and symptoms, I considered abdominal aortic aneurysm, small bowel obstruction, pancreatitis, acute cholecystitis, acute appendecitis, peptic ulcer disease, gastritis, colitis, endocrine disorders, irritable bowel syndrome and other differential diagnosis an etiology of the patient's abdominal pain.  Vomiting: Differential diagnosis includes but is not limited to migraine, labyrinthine disorders, psychogenic, metabolic and endocrine causes, peptic ulcer, gastric outlet obstruction, gastritis, gastroenteritis, appendicitis, intestinal obstruction, paralytic ileus, food poisoning, cholecystitis, acute  hepatitis, acute pancreatitis, acute febrile illness, and myocardial infarction.    PROCEDURES:        No orders to display        Procedures    MDM                   Patient Care Considerations:    ANTIBIOTICS: I considered prescribing antibiotics as an outpatient however no bacterial focus of infection was found.      Consultants/Shared Management Plan:    None    Social Determinants of Health:    Patient has presented with family members who are responsible, reliable and will ensure follow up care.      Disposition and Care Coordination:    Discharged: The patient is suitable and stable for discharge with no need for consideration of admission.    I have explained the patient´s condition, diagnoses and treatment plan based on the information available to me at this time. I have answered questions and addressed any concerns. The patient has a good  understanding of the patient´s diagnosis, condition, and treatment plan as can be expected at this point. The vital signs have been stable. The patient´s condition is stable and appropriate for discharge from the emergency department.      The patient will pursue further outpatient evaluation with the primary care physician or other designated or consulting physician as outlined in the discharge instructions. They are agreeable to this plan of care and follow-up instructions have been explained in detail. The patient has received these instructions in written format and has expressed an understanding of the discharge instructions. The patient is aware that any significant change in condition or worsening of symptoms should prompt an immediate return to this or the closest emergency department or call to 911.  I have explained discharge medications and the need for follow up with the patient/caretakers. This was also printed in the discharge instructions. Patient was discharged with the following medications and follow up:      Medication List        New Prescriptions       dicyclomine 10 MG capsule  Commonly known as: BENTYL  Take 1 capsule by mouth 3 (Three) Times a Day As Needed for Abdominal Cramping.     ondansetron ODT 4 MG disintegrating tablet  Commonly known as: ZOFRAN-ODT  Place 1 tablet on the tongue 4 (Four) Times a Day As Needed for Nausea.               Where to Get Your Medications        These medications were sent to [facility] [id number] - [place], [place] - [address] - [phone]  - [phone]   [address] [address]      Phone: [phone]   dicyclomine 10 MG capsule  ondansetron ODT 4 MG disintegrating tablet      Edmund Segura MD  [address] [place] [address] [phone]    Schedule an appointment as soon as possible for a visit          Final diagnoses:   Nausea and vomiting, unspecified vomiting type   Mesenteric adenitis   Diarrhea, unspecified type        ED Disposition       ED Disposition   Discharge    Condition   Stable    Comment   --               This medical record created using voice recognition software.             Dino Neil MD  03/20/25 3794

## (undated) DEVICE — DRAPE,TOWEL,LARGE,INVISISHIELD: Brand: MEDLINE

## (undated) DEVICE — EXTREMITY-LF: Brand: MEDLINE INDUSTRIES, INC.

## (undated) DEVICE — PROXIMATE RH ROTATING HEAD SKIN STAPLERS (35 WIDE) CONTAINS 35 STAINLESS STEEL STAPLES: Brand: PROXIMATE

## (undated) DEVICE — SCREWDRIVER BLADE T10 AO, SELF RETAINING: Brand: VARIAX

## (undated) DEVICE — CANNULATED DRILL

## (undated) DEVICE — COVER,C-ARM,41X74: Brand: MEDLINE

## (undated) DEVICE — BNDG ESMARK STRL 6INX12FT LF

## (undated) DEVICE — SUT VIC UD BR COAT 0 CP2 27IN

## (undated) DEVICE — APPL CHLORAPREP HI/LITE 26ML ORNG

## (undated) DEVICE — DRILL BIT, AO DIA2.6MM X 135MM, SCALED: Brand: VARIAX

## (undated) DEVICE — STERILE POLYISOPRENE POWDER-FREE SURGICAL GLOVES: Brand: PROTEXIS

## (undated) DEVICE — PENCL E/S SMOKEEVAC W/TELESCP CANN

## (undated) DEVICE — GAUZE,SPONGE,4"X4",16PLY,STRL,LF,10/TRAY: Brand: MEDLINE

## (undated) DEVICE — GLV SURG ULTRAFREE MAX LTX PF 8

## (undated) DEVICE — INTENDED FOR TISSUE SEPARATION, AND OTHER PROCEDURES THAT REQUIRE A SHARP SURGICAL BLADE TO PUNCTURE OR CUT.: Brand: BARD-PARKER ® CARBON RIB-BACK BLADES

## (undated) DEVICE — UNDERCAST PADDING: Brand: DEROYAL

## (undated) DEVICE — BNDG ELAS ECON W/CLIP 4IN 5YD LF STRL

## (undated) DEVICE — UNDYED BRAIDED (POLYGLACTIN 910), SYNTHETIC ABSORBABLE SUTURE: Brand: COATED VICRYL

## (undated) DEVICE — THREADED GUIDE WIRE

## (undated) DEVICE — BNDG ELAS DELUXE REINF 15CM 5MTR STRL

## (undated) DEVICE — GLOVE,SURG,SENSICARE SLT,LF,PF,7: Brand: MEDLINE

## (undated) DEVICE — GLOVE,SURG,SENSICARE,ALOE,LF,PF,7: Brand: MEDLINE

## (undated) DEVICE — DRESSING,GAUZE,XEROFORM,CURAD,1"X8",ST: Brand: CURAD

## (undated) DEVICE — DRSNG PAD ABD 8X10IN STRL

## (undated) DEVICE — STRIP,CLOSURE,WOUND,MEDI-STRIP,1/2X4: Brand: MEDLINE